# Patient Record
Sex: MALE | Race: ASIAN | NOT HISPANIC OR LATINO | URBAN - METROPOLITAN AREA
[De-identification: names, ages, dates, MRNs, and addresses within clinical notes are randomized per-mention and may not be internally consistent; named-entity substitution may affect disease eponyms.]

---

## 2017-03-12 ENCOUNTER — EMERGENCY (EMERGENCY)
Facility: HOSPITAL | Age: 32
LOS: 1 days | Discharge: HOME | End: 2017-03-12
Attending: EMERGENCY MEDICINE | Admitting: EMERGENCY MEDICINE
Payer: MEDICAID

## 2017-03-12 VITALS
DIASTOLIC BLOOD PRESSURE: 75 MMHG | RESPIRATION RATE: 18 BRPM | OXYGEN SATURATION: 100 % | TEMPERATURE: 98 F | HEART RATE: 78 BPM | SYSTOLIC BLOOD PRESSURE: 126 MMHG

## 2017-03-12 LAB
ALBUMIN SERPL ELPH-MCNC: 4.3 G/DL — SIGNIFICANT CHANGE UP (ref 3.3–5)
ALP SERPL-CCNC: 56 U/L — SIGNIFICANT CHANGE UP (ref 40–120)
ALT FLD-CCNC: 61 U/L — HIGH (ref 4–41)
APTT BLD: 32.4 SEC — SIGNIFICANT CHANGE UP (ref 27.5–37.4)
AST SERPL-CCNC: 27 U/L — SIGNIFICANT CHANGE UP (ref 4–40)
BASOPHILS # BLD AUTO: 0.03 K/UL — SIGNIFICANT CHANGE UP (ref 0–0.2)
BASOPHILS NFR BLD AUTO: 0.4 % — SIGNIFICANT CHANGE UP (ref 0–2)
BILIRUB SERPL-MCNC: 0.4 MG/DL — SIGNIFICANT CHANGE UP (ref 0.2–1.2)
BUN SERPL-MCNC: 14 MG/DL — SIGNIFICANT CHANGE UP (ref 7–23)
CALCIUM SERPL-MCNC: 9.2 MG/DL — SIGNIFICANT CHANGE UP (ref 8.4–10.5)
CHLORIDE SERPL-SCNC: 101 MMOL/L — SIGNIFICANT CHANGE UP (ref 98–107)
CK MB BLD-MCNC: 1.22 NG/ML — SIGNIFICANT CHANGE UP (ref 1–6.6)
CK MB BLD-MCNC: 1.37 NG/ML — SIGNIFICANT CHANGE UP (ref 1–6.6)
CK MB BLD-MCNC: SIGNIFICANT CHANGE UP (ref 0–2.5)
CK SERPL-CCNC: 117 U/L — SIGNIFICANT CHANGE UP (ref 30–200)
CK SERPL-CCNC: 124 U/L — SIGNIFICANT CHANGE UP (ref 30–200)
CO2 SERPL-SCNC: 25 MMOL/L — SIGNIFICANT CHANGE UP (ref 22–31)
CREAT SERPL-MCNC: 1.13 MG/DL — SIGNIFICANT CHANGE UP (ref 0.5–1.3)
EOSINOPHIL # BLD AUTO: 0.28 K/UL — SIGNIFICANT CHANGE UP (ref 0–0.5)
EOSINOPHIL NFR BLD AUTO: 3.9 % — SIGNIFICANT CHANGE UP (ref 0–6)
GLUCOSE SERPL-MCNC: 155 MG/DL — HIGH (ref 70–99)
HBA1C BLD-MCNC: 6.2 % — HIGH (ref 4–5.6)
HCT VFR BLD CALC: 44.5 % — SIGNIFICANT CHANGE UP (ref 39–50)
HGB BLD-MCNC: 14.7 G/DL — SIGNIFICANT CHANGE UP (ref 13–17)
IMM GRANULOCYTES NFR BLD AUTO: 0.1 % — SIGNIFICANT CHANGE UP (ref 0–1.5)
INR BLD: 1 — SIGNIFICANT CHANGE UP (ref 0.87–1.18)
LYMPHOCYTES # BLD AUTO: 2.74 K/UL — SIGNIFICANT CHANGE UP (ref 1–3.3)
LYMPHOCYTES # BLD AUTO: 38.6 % — SIGNIFICANT CHANGE UP (ref 13–44)
MCHC RBC-ENTMCNC: 25.4 PG — LOW (ref 27–34)
MCHC RBC-ENTMCNC: 33 % — SIGNIFICANT CHANGE UP (ref 32–36)
MCV RBC AUTO: 77 FL — LOW (ref 80–100)
MONOCYTES # BLD AUTO: 0.35 K/UL — SIGNIFICANT CHANGE UP (ref 0–0.9)
MONOCYTES NFR BLD AUTO: 4.9 % — SIGNIFICANT CHANGE UP (ref 2–14)
NEUTROPHILS # BLD AUTO: 3.69 K/UL — SIGNIFICANT CHANGE UP (ref 1.8–7.4)
NEUTROPHILS NFR BLD AUTO: 52.1 % — SIGNIFICANT CHANGE UP (ref 43–77)
PLATELET # BLD AUTO: 211 K/UL — SIGNIFICANT CHANGE UP (ref 150–400)
PMV BLD: 10.2 FL — SIGNIFICANT CHANGE UP (ref 7–13)
POTASSIUM SERPL-MCNC: 3.5 MMOL/L — SIGNIFICANT CHANGE UP (ref 3.5–5.3)
POTASSIUM SERPL-SCNC: 3.5 MMOL/L — SIGNIFICANT CHANGE UP (ref 3.5–5.3)
PROT SERPL-MCNC: 7.5 G/DL — SIGNIFICANT CHANGE UP (ref 6–8.3)
PROTHROM AB SERPL-ACNC: 11.4 SEC — SIGNIFICANT CHANGE UP (ref 10–13.1)
RBC # BLD: 5.78 M/UL — SIGNIFICANT CHANGE UP (ref 4.2–5.8)
RBC # FLD: 14.1 % — SIGNIFICANT CHANGE UP (ref 10.3–14.5)
SODIUM SERPL-SCNC: 142 MMOL/L — SIGNIFICANT CHANGE UP (ref 135–145)
TROPONIN T SERPL-MCNC: < 0.06 NG/ML — SIGNIFICANT CHANGE UP (ref 0–0.06)
TROPONIN T SERPL-MCNC: < 0.06 NG/ML — SIGNIFICANT CHANGE UP (ref 0–0.06)
WBC # BLD: 7.1 K/UL — SIGNIFICANT CHANGE UP (ref 3.8–10.5)
WBC # FLD AUTO: 7.1 K/UL — SIGNIFICANT CHANGE UP (ref 3.8–10.5)

## 2017-03-12 PROCEDURE — 71275 CT ANGIOGRAPHY CHEST: CPT | Mod: 26

## 2017-03-12 PROCEDURE — 93018 CV STRESS TEST I&R ONLY: CPT | Mod: GC

## 2017-03-12 PROCEDURE — 99218: CPT

## 2017-03-12 PROCEDURE — 93306 TTE W/DOPPLER COMPLETE: CPT | Mod: 26

## 2017-03-12 PROCEDURE — 71020: CPT | Mod: 26

## 2017-03-12 PROCEDURE — 93016 CV STRESS TEST SUPVJ ONLY: CPT | Mod: GC

## 2017-03-12 RX ORDER — ASPIRIN/CALCIUM CARB/MAGNESIUM 324 MG
162 TABLET ORAL ONCE
Qty: 0 | Refills: 0 | Status: COMPLETED | OUTPATIENT
Start: 2017-03-12 | End: 2017-03-12

## 2017-03-12 RX ADMIN — Medication 162 MILLIGRAM(S): at 05:14

## 2017-03-12 NOTE — ED PROVIDER NOTE - ATTENDING CONTRIBUTION TO CARE
31M presents with chest pain. Pain started 10d ago, lasts several minutes at a time.  Initially occurred every few days, but more recently has been occurring twice a day.  Pain is not related to exertion, not associated with PO intake.  No associated SOB, diaphoresis, nausea, or dizziness.  H/o abnormal ECHO as a child, which he has never followed up. On exam well appearing, nad, mmm, lungs clear, rrr, abd soft, no rash, no edema, 2+ pulses.  EKG LVH, LAD, no acute ischemia, CXR clear.  Trop negative.  Plan for CDU for serial trop, ECHO.

## 2017-03-12 NOTE — ED SUB INTERN NOTE - FAMILY HISTORY
Father  Still living? Unknown  Family history of acute myocardial infarction, Age at diagnosis: Age Unknown

## 2017-03-12 NOTE — ED ADULT NURSE NOTE - OBJECTIVE STATEMENT
Pt rcvd to 14 by Facilitator: c/o intermittent non-radiating L  sided CP x 10 days, relieved at present.  Pt states pain comes on for 3-5 mins at a time w/ increasing severity, then spontaneously resolves, pain is worse w/ deep inspiration. Denies sob/n/v/diaphoresis assc w/ pain. Denies significant PMHx, believes was told had a leaky valve when he was a child but no follow up.  Father had MI @ age 60.  Pt is . Denies recent travel/sick contacts/fevers/chills or other complaints.  Pt VSS, HR NSR on CM, Resps even/unlabored on % o2sat, appearing in NAD at this time. MD Fisher @ bedside for eval. IVL placed L AC 20g, labs obtained, call bell provided.  Awaits further plan of care. Report back to primary REMIGIO Garrett.

## 2017-03-12 NOTE — ED SUB INTERN NOTE - OBJECTIVE STATEMENT FT
30 yo M with no significant PMH, presents with left-sided chest pain x 10 days. Pain localized in the left mid-axillary region and does not radiate anywhere. 9/10 in severity and 10/10 with deep inspiration. No other alleviating or exacerbating factors. First episode of chest pain occurred 10 days ago, then recurred 7 days ago and 32 yo M with no significant PMH, presents with left-sided chest pain x 10 days. Pain localized in the left mid-axillary region and does not radiate anywhere. 9/10 in severity and 10/10 with deep inspiration. Chest pain described as heaviness in the chest and as if the nerves in the chest are pinched. Rubbing the chest slightly reduces the pain. No other alleviating or exacerbating factors. First episode of chest pain occurred 10 days prior to admission. Starting 3 days prior to admission, he has had 2 episodes of chest pain every day. Pain usually lasts 2-5 minutes. However, the last episode which brought him to the ED lasted for 2 hours. 30 yo M with no significant PMH, presents with left-sided chest pain x 10 days. Pain localized in the left mid-axillary region and does not radiate anywhere. 9/10 in severity and 10/10 with deep inspiration. Chest pain described as heaviness in the chest and as if the nerves in the chest are pinched. Rubbing the chest slightly reduces the pain. No other alleviating or exacerbating factors. First episode of chest pain occurred 10 days prior to admission. Starting 3 days prior to admission, he has had 2 episodes of chest pain every day. Pain usually lasts 2-5 minutes. However, the last episode which brought him to the ED lasted for 2 hours. Patient stated that he had an echo when he was younger 32 yo M with no significant PMH, presents with left-sided chest pain x 10 days. Pain localized in the left mid-axillary region and does not radiate anywhere. 9/10 in severity and 10/10 with deep inspiration. Chest pain described as heaviness in the chest and as if the nerves in the chest are pinched. Rubbing the chest slightly reduces the pain. No other alleviating or exacerbating factors. First episode of chest pain occurred 10 days prior to admission. Starting 3 days prior to admission, he has had 2 episodes of chest pain every day. Pain usually lasts 2-5 minutes. However, the last episode which brought him to the ED lasted for 2 hours. Denies fever, chills, cough, SOB, shoulder pain, and lower extremity edema. Patient stated that he had an echo when he was younger because he had a fever. Echo at the time showed blockage in one of his valves.

## 2017-03-12 NOTE — ED ADULT TRIAGE NOTE - CHIEF COMPLAINT QUOTE
Pt c/o left sided CP x10 days worsening, non radiating. Denies SOB, N/V. Denies any PMH but father had heart attack at 60. Pt appears comfortable in triage.

## 2017-03-12 NOTE — ED ADULT NURSE NOTE - ED STAT RN HANDOFF DETAILS
Rpt to CDU RN pt aaox4, vss, HR NSR on CM, denies CP currently, in NAD, stable at time of being brought over to CDU4

## 2017-03-12 NOTE — ED CDU PROVIDER NOTE - ATTENDING CONTRIBUTION TO CARE
CDU Attending Dr. Ruano: I assumed care of this pt at 7 AM on 3/12/17.  Pt is a 32 yo male with PMH "leaky valve" as a child, in ED with left axillary chest pain worse with deep inspiration and with body movement, as well as with exertion.  Symptoms present intermittently for 10 days.  No associated SOB, N/V/D, diaphoresis or abdominal pain.  On exam pt well appearing, in NAD, heart RRR, lungs CTAB, abd NTND, extremities without swelling, strength 5/5 in all extremities and skin without rash.

## 2017-03-12 NOTE — ED CDU PROVIDER NOTE - PROGRESS NOTE DETAILS
ZOIE Cobb:  Stress negative; Echo reveals severe aortic regurgitaiton with aortic dilation noted recommend CTA to further assess the aorta (ordered).  pt says he has appointment with Dr. Sharma (CT surgery @ [facility] next week). ZOIE Cobb:  Consulted cardiology Dr. Garcia over concern of aortic regurgitation.  Recommends pt to stay overnight and will see in AM.  Pt agrees to stay ZOIE Cobb:  Stress negative; Echo reveals severe aortic regurgitation with aortic dilation noted recommend CTA to further assess the aorta (ordered).  pt says he has appointment with Dr. Sharma (CT surgery @ [facility] next week). ZOIE Morillo  Pt sitting in bed comfortably, denies any chest pain or shortness of breath. CT angio chest performed, results are pending. ZOIE Morillo  Pt sitting in bed comfortably, denies any chest pain or shortness of breath. CT angio chest performed showing enlarged ascending thoracic aorta 3.5 cm. Dr. Garcia to see patient in morning. Will continue to monitor. ZOIE Morillo  Pt lying in bed comfortable on tele and in NAD. ce x 2- Neg. Will continue to monitor. ZOIE Dukes: Pt seen by Dr. Garcia, states is stable for outpatient follow up with cardiothoracic surgery, pt ok with plan. Discussed elevated A1C results and proper diet. CDU Att DC Note: Lobito Burgos is well appearing.  Seen by Dr. Garcia who recommended outpt f/u.  Pt already had appt pending with [facility].  Understands that he requires surgical repair d/t severity of valvular disease, though he asked several times if medical management would help. I advised that he f/u c [facility] as scheduled and d/w them, that he will likely require surgery.  Pt expressed acceptance/understanding. I have personally performed a face to face evaluation of this patient including review of the history and focused physical exam.  I have discussed the case and reviewed the plan of care with the PA. ZOIE Dukes: Pt wanted to follow up with cardiothoracic surgery here. Made an appt with Dr. Philip Alfred at 1:45 on 03/16/17, [address], contact information [place] [phone]

## 2017-03-12 NOTE — ED CDU PROVIDER NOTE - OBJECTIVE STATEMENT
31 M complaining of chest pain intermittent for past 10 days.  Pain is worse with inspiration.  No cough/fevers, no recent travel.  Patient reports abnormal echo as child but has never followed up as an adult.  No shortness of breath.  No nausea/vomiting.    CDU Note: Agree with above. 30 y/o male c/o intermittent left sided chest pain x 10 days - c/o pain worse certain movements and deep inspiration. States became more pronounced over past two days. States hx of abnormal echos in the past ?leaky valve. Sent to CDU for echo ce x 2 tele. Pt. resting comfortably currently.

## 2017-03-12 NOTE — ED SUB INTERN NOTE - MEDICAL DECISION MAKING DETAILS
Left axis deviation on EKG. Negative troponin and negative CK-MB. ACS unlikely. However, given history of valvular abnormality, transfer patient to CDU so patient can get an inpatient Echo during the day.

## 2017-03-12 NOTE — ED PROVIDER NOTE - OBJECTIVE STATEMENT
31 M complaining of chest pain intermittent for past 10 days.  Pain is worse with inspiration.  No cough/fevers, no recent travel.  Patient reports abnormal echo as child but has never followed up as an adult.  No shortness of breath.  No nausea/vomiting.

## 2017-03-13 VITALS
HEART RATE: 75 BPM | SYSTOLIC BLOOD PRESSURE: 120 MMHG | OXYGEN SATURATION: 100 % | TEMPERATURE: 98 F | DIASTOLIC BLOOD PRESSURE: 79 MMHG | RESPIRATION RATE: 18 BRPM

## 2017-03-13 PROBLEM — Z00.00 ENCOUNTER FOR PREVENTIVE HEALTH EXAMINATION: Status: ACTIVE | Noted: 2017-03-13

## 2017-03-13 PROCEDURE — 99217: CPT

## 2017-03-15 PROBLEM — Z78.9 ALCOHOL INGESTION: Status: ACTIVE | Noted: 2017-03-15

## 2017-03-15 PROBLEM — Z82.49 FAMILY HISTORY OF ACUTE MYOCARDIAL INFARCTION: Status: ACTIVE | Noted: 2017-03-15

## 2017-03-16 ENCOUNTER — APPOINTMENT (OUTPATIENT)
Dept: CARDIOTHORACIC SURGERY | Facility: CLINIC | Age: 32
End: 2017-03-16

## 2017-03-16 DIAGNOSIS — Z78.9 OTHER SPECIFIED HEALTH STATUS: ICD-10-CM

## 2017-03-16 DIAGNOSIS — Z82.49 FAMILY HISTORY OF ISCHEMIC HEART DISEASE AND OTHER DISEASES OF THE CIRCULATORY SYSTEM: ICD-10-CM

## 2017-03-20 ENCOUNTER — APPOINTMENT (OUTPATIENT)
Dept: CARDIOTHORACIC SURGERY | Facility: CLINIC | Age: 32
End: 2017-03-20

## 2017-03-20 VITALS
OXYGEN SATURATION: 97 % | TEMPERATURE: 98.4 F | HEART RATE: 90 BPM | DIASTOLIC BLOOD PRESSURE: 84 MMHG | HEIGHT: 71 IN | SYSTOLIC BLOOD PRESSURE: 134 MMHG | WEIGHT: 205 LBS | RESPIRATION RATE: 15 BRPM | BODY MASS INDEX: 28.7 KG/M2

## 2017-03-20 VITALS — DIASTOLIC BLOOD PRESSURE: 87 MMHG | SYSTOLIC BLOOD PRESSURE: 130 MMHG

## 2017-03-20 DIAGNOSIS — I35.1 NONRHEUMATIC AORTIC (VALVE) INSUFFICIENCY: ICD-10-CM

## 2017-03-20 DIAGNOSIS — G43.909 MIGRAINE, UNSPECIFIED, NOT INTRACTABLE, W/OUT STATUS MIGRAINOSUS: ICD-10-CM

## 2017-03-20 DIAGNOSIS — Z83.3 FAMILY HISTORY OF DIABETES MELLITUS: ICD-10-CM

## 2017-03-20 DIAGNOSIS — R73.03 PREDIABETES.: ICD-10-CM

## 2017-03-20 DIAGNOSIS — Z87.891 PERSONAL HISTORY OF NICOTINE DEPENDENCE: ICD-10-CM

## 2017-03-20 DIAGNOSIS — E78.00 PURE HYPERCHOLESTEROLEMIA, UNSPECIFIED: ICD-10-CM

## 2017-03-20 DIAGNOSIS — Z82.49 FAMILY HISTORY OF ISCHEMIC HEART DISEASE AND OTHER DISEASES OF THE CIRCULATORY SYSTEM: ICD-10-CM

## 2017-03-20 RX ORDER — ACETAMINOPHEN 325 MG/1
325 TABLET ORAL
Qty: 30 | Refills: 0 | Status: ACTIVE | COMMUNITY

## 2017-03-22 PROBLEM — I35.1 SEVERE AORTIC INSUFFICIENCY: Status: ACTIVE | Noted: 2017-03-15

## 2017-06-06 ENCOUNTER — EMERGENCY (EMERGENCY)
Facility: HOSPITAL | Age: 32
LOS: 1 days | Discharge: HOME | End: 2017-06-06
Attending: EMERGENCY MEDICINE | Admitting: EMERGENCY MEDICINE
Payer: MEDICAID

## 2017-06-06 VITALS
DIASTOLIC BLOOD PRESSURE: 81 MMHG | HEART RATE: 74 BPM | OXYGEN SATURATION: 100 % | RESPIRATION RATE: 16 BRPM | TEMPERATURE: 98 F | SYSTOLIC BLOOD PRESSURE: 128 MMHG

## 2017-06-06 VITALS
RESPIRATION RATE: 16 BRPM | HEART RATE: 60 BPM | SYSTOLIC BLOOD PRESSURE: 114 MMHG | DIASTOLIC BLOOD PRESSURE: 57 MMHG | OXYGEN SATURATION: 100 %

## 2017-06-06 LAB
ALBUMIN SERPL ELPH-MCNC: 4.4 G/DL — SIGNIFICANT CHANGE UP (ref 3.3–5)
ALP SERPL-CCNC: 65 U/L — SIGNIFICANT CHANGE UP (ref 40–120)
ALT FLD-CCNC: 69 U/L — HIGH (ref 4–41)
AST SERPL-CCNC: 27 U/L — SIGNIFICANT CHANGE UP (ref 4–40)
BASOPHILS # BLD AUTO: 0.04 K/UL — SIGNIFICANT CHANGE UP (ref 0–0.2)
BASOPHILS NFR BLD AUTO: 0.6 % — SIGNIFICANT CHANGE UP (ref 0–2)
BILIRUB SERPL-MCNC: 0.2 MG/DL — SIGNIFICANT CHANGE UP (ref 0.2–1.2)
BUN SERPL-MCNC: 14 MG/DL — SIGNIFICANT CHANGE UP (ref 7–23)
CALCIUM SERPL-MCNC: 9.3 MG/DL — SIGNIFICANT CHANGE UP (ref 8.4–10.5)
CHLORIDE SERPL-SCNC: 102 MMOL/L — SIGNIFICANT CHANGE UP (ref 98–107)
CK MB BLD-MCNC: 1.36 NG/ML — SIGNIFICANT CHANGE UP (ref 1–6.6)
CK MB BLD-MCNC: SIGNIFICANT CHANGE UP (ref 0–2.5)
CK SERPL-CCNC: 148 U/L — SIGNIFICANT CHANGE UP (ref 30–200)
CO2 SERPL-SCNC: 29 MMOL/L — SIGNIFICANT CHANGE UP (ref 22–31)
CREAT SERPL-MCNC: 1.2 MG/DL — SIGNIFICANT CHANGE UP (ref 0.5–1.3)
EOSINOPHIL # BLD AUTO: 0.25 K/UL — SIGNIFICANT CHANGE UP (ref 0–0.5)
EOSINOPHIL NFR BLD AUTO: 3.7 % — SIGNIFICANT CHANGE UP (ref 0–6)
GLUCOSE SERPL-MCNC: 109 MG/DL — HIGH (ref 70–99)
HCT VFR BLD CALC: 45.2 % — SIGNIFICANT CHANGE UP (ref 39–50)
HGB BLD-MCNC: 14.5 G/DL — SIGNIFICANT CHANGE UP (ref 13–17)
IMM GRANULOCYTES NFR BLD AUTO: 0.1 % — SIGNIFICANT CHANGE UP (ref 0–1.5)
LYMPHOCYTES # BLD AUTO: 2.9 K/UL — SIGNIFICANT CHANGE UP (ref 1–3.3)
LYMPHOCYTES # BLD AUTO: 43 % — SIGNIFICANT CHANGE UP (ref 13–44)
MCHC RBC-ENTMCNC: 25.6 PG — LOW (ref 27–34)
MCHC RBC-ENTMCNC: 32.1 % — SIGNIFICANT CHANGE UP (ref 32–36)
MCV RBC AUTO: 79.7 FL — LOW (ref 80–100)
MONOCYTES # BLD AUTO: 0.55 K/UL — SIGNIFICANT CHANGE UP (ref 0–0.9)
MONOCYTES NFR BLD AUTO: 8.2 % — SIGNIFICANT CHANGE UP (ref 2–14)
NEUTROPHILS # BLD AUTO: 2.99 K/UL — SIGNIFICANT CHANGE UP (ref 1.8–7.4)
NEUTROPHILS NFR BLD AUTO: 44.4 % — SIGNIFICANT CHANGE UP (ref 43–77)
PLATELET # BLD AUTO: 233 K/UL — SIGNIFICANT CHANGE UP (ref 150–400)
PMV BLD: 10.1 FL — SIGNIFICANT CHANGE UP (ref 7–13)
POTASSIUM SERPL-MCNC: 3.8 MMOL/L — SIGNIFICANT CHANGE UP (ref 3.5–5.3)
POTASSIUM SERPL-SCNC: 3.8 MMOL/L — SIGNIFICANT CHANGE UP (ref 3.5–5.3)
PROT SERPL-MCNC: 7.4 G/DL — SIGNIFICANT CHANGE UP (ref 6–8.3)
RBC # BLD: 5.67 M/UL — SIGNIFICANT CHANGE UP (ref 4.2–5.8)
RBC # FLD: 14.8 % — HIGH (ref 10.3–14.5)
SODIUM SERPL-SCNC: 142 MMOL/L — SIGNIFICANT CHANGE UP (ref 135–145)
TROPONIN T SERPL-MCNC: < 0.06 NG/ML — SIGNIFICANT CHANGE UP (ref 0–0.06)
WBC # BLD: 6.74 K/UL — SIGNIFICANT CHANGE UP (ref 3.8–10.5)
WBC # FLD AUTO: 6.74 K/UL — SIGNIFICANT CHANGE UP (ref 3.8–10.5)

## 2017-06-06 PROCEDURE — 71020: CPT | Mod: 26

## 2017-06-06 PROCEDURE — 93010 ELECTROCARDIOGRAM REPORT: CPT

## 2017-06-06 PROCEDURE — 99285 EMERGENCY DEPT VISIT HI MDM: CPT | Mod: 25

## 2017-06-06 NOTE — ED ADULT NURSE NOTE - OBJECTIVE STATEMENT
Pt. received in room # 6, A&O x3 with c/o chest pain x 2 days; worst today with sob. Pt. stated he had a leaky heart valve and was here for the same thing 3 months ago. CM to NSR. no acute distress noted. will continue to monitor.

## 2017-06-06 NOTE — ED ADULT TRIAGE NOTE - CHIEF COMPLAINT QUOTE
pt. c/o left sided intermittent chest pain . Pt. states pain started 3 days ago , and states he occasionally feels dizzy. Pt. denies dizziness and chest pain at this moment .  Pt. states he has a valve leakage and was told he will be needing surgery. Pt. appears comfort bale in triage. EKG will be completed in triage.

## 2017-06-06 NOTE — ED PROVIDER NOTE - MEDICAL DECISION MAKING DETAILS
well appearing, normal VS, chest pain is pinching, fleeting, nonexertional and nonradiating, will get XR, EKG, if normal pt needs to f/u with CT surg Dr. mills

## 2017-06-06 NOTE — ED PROVIDER NOTE - OBJECTIVE STATEMENT
31M h/o aortic regurg p/w chest pain.  States it is a pinching pain, comes and goes, lasts for a second, pain is non-radiating, non-exertional.  Pt had pain similar to this a few months ago, was seen in ED and had ECHO showing severe aortic regurg, pt was seen by CT surgeon Dr. Alfred who recommended AVR at some point, pt wanted to wait.  Pain he is feeling today is the same as last time, not any worse, denies SOB, cough, fever, abd pain, n/v/d.

## 2017-08-24 ENCOUNTER — EMERGENCY (EMERGENCY)
Facility: HOSPITAL | Age: 32
LOS: 1 days | Discharge: HOME | End: 2017-08-24
Attending: EMERGENCY MEDICINE | Admitting: EMERGENCY MEDICINE
Payer: MEDICAID

## 2017-08-24 VITALS
RESPIRATION RATE: 18 BRPM | OXYGEN SATURATION: 100 % | TEMPERATURE: 98 F | DIASTOLIC BLOOD PRESSURE: 68 MMHG | HEART RATE: 77 BPM | SYSTOLIC BLOOD PRESSURE: 113 MMHG

## 2017-08-24 VITALS — HEART RATE: 87 BPM | TEMPERATURE: 98 F

## 2017-08-24 LAB
ALBUMIN SERPL ELPH-MCNC: 4.5 G/DL — SIGNIFICANT CHANGE UP (ref 3.3–5)
ALP SERPL-CCNC: 63 U/L — SIGNIFICANT CHANGE UP (ref 40–120)
ALT FLD-CCNC: 92 U/L — HIGH (ref 4–41)
APTT BLD: 33.9 SEC — SIGNIFICANT CHANGE UP (ref 27.5–37.4)
AST SERPL-CCNC: 38 U/L — SIGNIFICANT CHANGE UP (ref 4–40)
BASOPHILS # BLD AUTO: 0.02 K/UL — SIGNIFICANT CHANGE UP (ref 0–0.2)
BASOPHILS NFR BLD AUTO: 0.4 % — SIGNIFICANT CHANGE UP (ref 0–2)
BILIRUB SERPL-MCNC: 0.3 MG/DL — SIGNIFICANT CHANGE UP (ref 0.2–1.2)
BUN SERPL-MCNC: 17 MG/DL — SIGNIFICANT CHANGE UP (ref 7–23)
CALCIUM SERPL-MCNC: 9.9 MG/DL — SIGNIFICANT CHANGE UP (ref 8.4–10.5)
CHLORIDE SERPL-SCNC: 104 MMOL/L — SIGNIFICANT CHANGE UP (ref 98–107)
CK MB BLD-MCNC: 1.72 NG/ML — SIGNIFICANT CHANGE UP (ref 1–6.6)
CO2 SERPL-SCNC: 27 MMOL/L — SIGNIFICANT CHANGE UP (ref 22–31)
CREAT SERPL-MCNC: 1.04 MG/DL — SIGNIFICANT CHANGE UP (ref 0.5–1.3)
EOSINOPHIL # BLD AUTO: 0.21 K/UL — SIGNIFICANT CHANGE UP (ref 0–0.5)
EOSINOPHIL NFR BLD AUTO: 3.9 % — SIGNIFICANT CHANGE UP (ref 0–6)
GLUCOSE SERPL-MCNC: 101 MG/DL — HIGH (ref 70–99)
HCT VFR BLD CALC: 44.9 % — SIGNIFICANT CHANGE UP (ref 39–50)
HGB BLD-MCNC: 14.4 G/DL — SIGNIFICANT CHANGE UP (ref 13–17)
IMM GRANULOCYTES # BLD AUTO: 0.02 # — SIGNIFICANT CHANGE UP
IMM GRANULOCYTES NFR BLD AUTO: 0.4 % — SIGNIFICANT CHANGE UP (ref 0–1.5)
INR BLD: 0.9 — SIGNIFICANT CHANGE UP (ref 0.88–1.17)
LYMPHOCYTES # BLD AUTO: 2.33 K/UL — SIGNIFICANT CHANGE UP (ref 1–3.3)
LYMPHOCYTES # BLD AUTO: 42.8 % — SIGNIFICANT CHANGE UP (ref 13–44)
MCHC RBC-ENTMCNC: 24.9 PG — LOW (ref 27–34)
MCHC RBC-ENTMCNC: 32.1 % — SIGNIFICANT CHANGE UP (ref 32–36)
MCV RBC AUTO: 77.5 FL — LOW (ref 80–100)
MONOCYTES # BLD AUTO: 0.46 K/UL — SIGNIFICANT CHANGE UP (ref 0–0.9)
MONOCYTES NFR BLD AUTO: 8.5 % — SIGNIFICANT CHANGE UP (ref 2–14)
NEUTROPHILS # BLD AUTO: 2.4 K/UL — SIGNIFICANT CHANGE UP (ref 1.8–7.4)
NEUTROPHILS NFR BLD AUTO: 44 % — SIGNIFICANT CHANGE UP (ref 43–77)
NRBC # FLD: 0 — SIGNIFICANT CHANGE UP
PLATELET # BLD AUTO: 230 K/UL — SIGNIFICANT CHANGE UP (ref 150–400)
PMV BLD: 9.7 FL — SIGNIFICANT CHANGE UP (ref 7–13)
POTASSIUM SERPL-MCNC: 3.7 MMOL/L — SIGNIFICANT CHANGE UP (ref 3.5–5.3)
POTASSIUM SERPL-SCNC: 3.7 MMOL/L — SIGNIFICANT CHANGE UP (ref 3.5–5.3)
PROT SERPL-MCNC: 7.6 G/DL — SIGNIFICANT CHANGE UP (ref 6–8.3)
PROTHROM AB SERPL-ACNC: 10.1 SEC — SIGNIFICANT CHANGE UP (ref 9.8–13.1)
RBC # BLD: 5.79 M/UL — SIGNIFICANT CHANGE UP (ref 4.2–5.8)
RBC # FLD: 13.9 % — SIGNIFICANT CHANGE UP (ref 10.3–14.5)
SODIUM SERPL-SCNC: 143 MMOL/L — SIGNIFICANT CHANGE UP (ref 135–145)
TROPONIN T SERPL-MCNC: < 0.06 NG/ML — SIGNIFICANT CHANGE UP (ref 0–0.06)
WBC # BLD: 5.44 K/UL — SIGNIFICANT CHANGE UP (ref 3.8–10.5)
WBC # FLD AUTO: 5.44 K/UL — SIGNIFICANT CHANGE UP (ref 3.8–10.5)

## 2017-08-24 PROCEDURE — 71020: CPT | Mod: 26

## 2017-08-24 PROCEDURE — 93010 ELECTROCARDIOGRAM REPORT: CPT | Mod: 59

## 2017-08-24 PROCEDURE — 99284 EMERGENCY DEPT VISIT MOD MDM: CPT | Mod: 25

## 2017-08-24 RX ORDER — ACETAMINOPHEN 500 MG
975 TABLET ORAL ONCE
Qty: 0 | Refills: 0 | Status: COMPLETED | OUTPATIENT
Start: 2017-08-24 | End: 2017-08-24

## 2017-08-24 RX ORDER — ASPIRIN/CALCIUM CARB/MAGNESIUM 324 MG
162 TABLET ORAL ONCE
Qty: 0 | Refills: 0 | Status: COMPLETED | OUTPATIENT
Start: 2017-08-24 | End: 2017-08-24

## 2017-08-24 RX ADMIN — Medication 162 MILLIGRAM(S): at 13:31

## 2017-08-24 RX ADMIN — Medication 975 MILLIGRAM(S): at 14:49

## 2017-08-24 NOTE — ED PROVIDER NOTE - PROGRESS NOTE DETAILS
Gollogly: trop neg. Pt feels well, comfortable going home and following up outpt. As pt's pain x 1 week, will not repeat. Will give cardiology follow up. Instructed pt to see own CT surgeon.

## 2017-08-24 NOTE — ED PROVIDER NOTE - OBJECTIVE STATEMENT
31M h/o aortic regurgitation p/w CP. Pt with 2 different types of CP - one is a pinching pain x 1 week, L-sided radiating to the back, worse with movement of the L arm, pleuritic, similar to when pt went to the ED in June. The other pain is retrosternal, non-radiating, exertional, described as heaviness, started yesterday at rest, intermittent. FH: mother had MI at age 45. Nonsmoker. Pt recommended to have AVR by CT surgeon Dr Perdue, currently no follow up appt. No fever, cough, SOB, vomiting, hx DVT/PE/cancer, calf pain/swelling, hemoptysis, or recent trauma/surgery/immobilization. 31M h/o aortic regurgitation p/w CP. Pt with 2 different types of CP - one is a pinching pain x 1 week, L-sided radiating to the back, worse with movement of the L arm, pleuritic, similar to when pt went to the ED in June. The other pain is retrosternal, non-radiating, exertional, described as heaviness, started yesterday at rest, intermittent. FH: mother had MI at age 45. Nonsmoker. Pt recommended to have AVR by CT surgeon Dr Perdue, currently no follow up appt. No fever, cough, SOB, vomiting, hx DVT/PE/cancer, calf pain/swelling, hemoptysis, or recent trauma/surgery/immobilization.  Attending - Reviewed above.  I evaluated patient myself and obtained the following history.  I reviewed previous [place] records.  30 y/o M hx AR, states that for past approx 1 week he has pain in left side of chest, in mid-axillary line, only with movement.  Also notes pain present when he lifts heavy object with left arm.  No known trauma to area.  No shortness of breathe.  No n/v/diaphoresis.  Has not taken any medication for the pain. 31M h/o aortic regurgitation p/w CP. Pt with 2 different types of CP - one is a pinching pain x 1 week, L-sided radiating to the back, worse with movement of the L arm, pleuritic, similar to when pt went to the ED in June. The other pain is retrosternal, non-radiating, exertional, described as heaviness, started at rest, x1 week. FH: mother had MI at age 45. Nonsmoker. Pt recommended to have AVR by CT surgeon Dr Perdue, currently no follow up appt. No fever, cough, SOB, vomiting, hx DVT/PE/cancer, calf pain/swelling, hemoptysis, or recent trauma/surgery/immobilization.  Attending - Reviewed above.  I evaluated patient myself and obtained the following history.  I reviewed previous [place] records.  30 y/o M hx AR, states that for past approx 1 week he has pain in left side of chest, in mid-axillary line, only with movement.  Also notes pain present when he lifts heavy object with left arm.  No known trauma to area.  No shortness of breathe.  No n/v/diaphoresis.  Has not taken any medication for the pain.

## 2017-08-24 NOTE — ED PROVIDER NOTE - PHYSICAL EXAMINATION
ATTENDING PHYSICAL EXAM DR. Trinh ***GEN - NAD; well appearing; A+O x3 ***HEAD - NC/AT ***EYES/NOSE - PERRL, EOMI, mucous membranes moist, no discharge ***THROAT: Oral cavity and pharynx normal. No inflammation, swelling, exudate, or lesions.  ***NECK: Neck supple, non-tender without lymphadenopathy, no masses, no JVD.   ***PULMONARY - CTA b/l, symmetric breath sounds. ***CARDIAC -s1s2, RRR, no M,R,G  ***ABDOMEN - +BS, ND, NT, soft, no guarding, no rebound, no masses   ***BACK - no CVA tenderness, Normal  spine ***EXTREMITIES - symmetric pulses, 2+ dp, capillary refill < 2 seconds, no clubbing, no cyanosis, no edema ***SKIN - no rash or bruising   ***NEUROLOGIC - alert, CN 2-12 intact, reflexes nl, sensation nl, coordination nl, finger to nose nl, romberg negative, motor 5/5 RUE/LUE/RLE/LLE/EHL/Plantar flexion, no pronator drift, gait nl

## 2017-08-24 NOTE — ED PROVIDER NOTE - CARE PLAN
Principal Discharge DX:	Chest pain  Instructions for follow-up, activity and diet:	-- Please follow up with a cardiologist within a week. Call [phone] to schedule an appointment ASAP. Most patients can be seen within 48 hours. Mention that you were just discharged from the emergency room and need to be seen immediately.   -- Follow up with Dr Hills within 1 week.  -- Return to ER immediately for new or worsening symptoms, any urgent issues, or for any concerns.

## 2017-08-24 NOTE — ED ADULT NURSE NOTE - OBJECTIVE STATEMENT
Alert and oriented x 4. Pt received to spot 1 due to chest pain. Pt states : " I have pain on and off for one week. When I lift my left arm it hurts more." Pt has no pain at this time. Pt denies shortness of breath, nausea, vomiting or dizziness.  No painful urination or diarrhea. Pt ambulates. IV 20g to right AC. Labs drawn. VSS. Sinus rhythm on cardiac monitor. Will continue to monitor.

## 2017-08-24 NOTE — ED PROVIDER NOTE - MEDICAL DECISION MAKING DETAILS
31M h/o aortic regurg p/w new chest pain since yesterday. Concern for ACS. No risk factors for PE (perc neg). Plan: ekg, cxr, labs, trop x 2, follow up with cardiology and with pt's CT surgeon for AVR.

## 2017-08-24 NOTE — ED PROVIDER NOTE - PLAN OF CARE
-- Please follow up with a cardiologist within a week. Call [phone] to schedule an appointment ASAP. Most patients can be seen within 48 hours. Mention that you were just discharged from the emergency room and need to be seen immediately.   -- Follow up with Dr Hills within 1 week.  -- Return to ER immediately for new or worsening symptoms, any urgent issues, or for any concerns.

## 2017-12-04 NOTE — ED PROVIDER NOTE - PSH
No significant past surgical history Normal vision: sees adequately in most situations; can see medication labels, newsprint/with glasses

## 2018-06-23 ENCOUNTER — EMERGENCY (EMERGENCY)
Facility: HOSPITAL | Age: 33
LOS: 1 days | Discharge: HOME | End: 2018-06-23
Attending: EMERGENCY MEDICINE | Admitting: EMERGENCY MEDICINE
Payer: MEDICAID

## 2018-06-23 PROCEDURE — 99285 EMERGENCY DEPT VISIT HI MDM: CPT | Mod: 25

## 2018-06-24 VITALS
SYSTOLIC BLOOD PRESSURE: 132 MMHG | OXYGEN SATURATION: 100 % | DIASTOLIC BLOOD PRESSURE: 73 MMHG | HEART RATE: 81 BPM | RESPIRATION RATE: 18 BRPM | TEMPERATURE: 98 F

## 2018-06-24 VITALS
RESPIRATION RATE: 14 BRPM | OXYGEN SATURATION: 98 % | SYSTOLIC BLOOD PRESSURE: 120 MMHG | DIASTOLIC BLOOD PRESSURE: 71 MMHG | TEMPERATURE: 98 F | HEART RATE: 94 BPM

## 2018-06-24 LAB
ALBUMIN SERPL ELPH-MCNC: 4.2 G/DL — SIGNIFICANT CHANGE UP (ref 3.3–5)
ALP SERPL-CCNC: 60 U/L — SIGNIFICANT CHANGE UP (ref 40–120)
ALT FLD-CCNC: 128 U/L — HIGH (ref 4–41)
AST SERPL-CCNC: 51 U/L — HIGH (ref 4–40)
BASOPHILS # BLD AUTO: 0.04 K/UL — SIGNIFICANT CHANGE UP (ref 0–0.2)
BASOPHILS NFR BLD AUTO: 0.5 % — SIGNIFICANT CHANGE UP (ref 0–2)
BILIRUB SERPL-MCNC: 0.2 MG/DL — SIGNIFICANT CHANGE UP (ref 0.2–1.2)
BUN SERPL-MCNC: 17 MG/DL — SIGNIFICANT CHANGE UP (ref 7–23)
CALCIUM SERPL-MCNC: 9.1 MG/DL — SIGNIFICANT CHANGE UP (ref 8.4–10.5)
CHLORIDE SERPL-SCNC: 99 MMOL/L — SIGNIFICANT CHANGE UP (ref 98–107)
CO2 SERPL-SCNC: 26 MMOL/L — SIGNIFICANT CHANGE UP (ref 22–31)
CREAT SERPL-MCNC: 1.3 MG/DL — SIGNIFICANT CHANGE UP (ref 0.5–1.3)
EOSINOPHIL # BLD AUTO: 0.31 K/UL — SIGNIFICANT CHANGE UP (ref 0–0.5)
EOSINOPHIL NFR BLD AUTO: 3.5 % — SIGNIFICANT CHANGE UP (ref 0–6)
GLUCOSE SERPL-MCNC: 112 MG/DL — HIGH (ref 70–99)
HCT VFR BLD CALC: 45.4 % — SIGNIFICANT CHANGE UP (ref 39–50)
HGB BLD-MCNC: 14.4 G/DL — SIGNIFICANT CHANGE UP (ref 13–17)
IMM GRANULOCYTES # BLD AUTO: 0.03 # — SIGNIFICANT CHANGE UP
IMM GRANULOCYTES NFR BLD AUTO: 0.3 % — SIGNIFICANT CHANGE UP (ref 0–1.5)
LYMPHOCYTES # BLD AUTO: 2.82 K/UL — SIGNIFICANT CHANGE UP (ref 1–3.3)
LYMPHOCYTES # BLD AUTO: 31.9 % — SIGNIFICANT CHANGE UP (ref 13–44)
MCHC RBC-ENTMCNC: 25.1 PG — LOW (ref 27–34)
MCHC RBC-ENTMCNC: 31.7 % — LOW (ref 32–36)
MCV RBC AUTO: 79.2 FL — LOW (ref 80–100)
MONOCYTES # BLD AUTO: 0.6 K/UL — SIGNIFICANT CHANGE UP (ref 0–0.9)
MONOCYTES NFR BLD AUTO: 6.8 % — SIGNIFICANT CHANGE UP (ref 2–14)
NEUTROPHILS # BLD AUTO: 5.05 K/UL — SIGNIFICANT CHANGE UP (ref 1.8–7.4)
NEUTROPHILS NFR BLD AUTO: 57 % — SIGNIFICANT CHANGE UP (ref 43–77)
NRBC # FLD: 0 — SIGNIFICANT CHANGE UP
PLATELET # BLD AUTO: 228 K/UL — SIGNIFICANT CHANGE UP (ref 150–400)
PMV BLD: 10.7 FL — SIGNIFICANT CHANGE UP (ref 7–13)
POTASSIUM SERPL-MCNC: 3.7 MMOL/L — SIGNIFICANT CHANGE UP (ref 3.5–5.3)
POTASSIUM SERPL-SCNC: 3.7 MMOL/L — SIGNIFICANT CHANGE UP (ref 3.5–5.3)
PROT SERPL-MCNC: 7.3 G/DL — SIGNIFICANT CHANGE UP (ref 6–8.3)
RBC # BLD: 5.73 M/UL — SIGNIFICANT CHANGE UP (ref 4.2–5.8)
RBC # FLD: 14.1 % — SIGNIFICANT CHANGE UP (ref 10.3–14.5)
SODIUM SERPL-SCNC: 137 MMOL/L — SIGNIFICANT CHANGE UP (ref 135–145)
TROPONIN T, HIGH SENSITIVITY: 11 NG/L — SIGNIFICANT CHANGE UP (ref ?–14)
TROPONIN T, HIGH SENSITIVITY: 12 NG/L — SIGNIFICANT CHANGE UP (ref ?–14)
WBC # BLD: 8.85 K/UL — SIGNIFICANT CHANGE UP (ref 3.8–10.5)
WBC # FLD AUTO: 8.85 K/UL — SIGNIFICANT CHANGE UP (ref 3.8–10.5)

## 2018-06-24 PROCEDURE — 71046 X-RAY EXAM CHEST 2 VIEWS: CPT | Mod: 26

## 2018-06-24 RX ORDER — SODIUM CHLORIDE 9 MG/ML
3 INJECTION INTRAMUSCULAR; INTRAVENOUS; SUBCUTANEOUS ONCE
Qty: 0 | Refills: 0 | Status: COMPLETED | OUTPATIENT
Start: 2018-06-24 | End: 2018-06-24

## 2018-06-24 RX ORDER — IBUPROFEN 200 MG
600 TABLET ORAL ONCE
Qty: 0 | Refills: 0 | Status: COMPLETED | OUTPATIENT
Start: 2018-06-24 | End: 2018-06-24

## 2018-06-24 RX ADMIN — Medication 600 MILLIGRAM(S): at 03:45

## 2018-06-24 RX ADMIN — SODIUM CHLORIDE 3 MILLILITER(S): 9 INJECTION INTRAMUSCULAR; INTRAVENOUS; SUBCUTANEOUS at 03:00

## 2018-06-24 NOTE — ED PROVIDER NOTE - PHYSICAL EXAMINATION
Gen: NAD, AOx3  Head: NCAT  HEENT: PERRL, oral mucosa moist, normal conjunctiva  Lung: CTAB, no respiratory distress  CV: rrr, no murmurs, Normal perfusion  Abd: soft, NTND, no CVA tenderness  MSK: No edema, no visible deformities, nontender chest wall, L shoulder nontender  Neuro: No focal neurologic deficits  Skin: No rash   Psych: normal affect

## 2018-06-24 NOTE — ED ADULT NURSE NOTE - CHIEF COMPLAINT QUOTE
C/o left sided chest pain worsening since this afternoon seen at [place] for same problem with full negative cardiac workup but states "pain didn't go away". Denies PMH, EKG in progress

## 2018-06-24 NOTE — ED ADULT TRIAGE NOTE - CHIEF COMPLAINT QUOTE
C/o left sided chest pain worsening since this afternoon seen at [place] for same problem with full negative cardiac workup but states "pain didn't go away". Denies PMH, EKG in progress

## 2018-06-24 NOTE — ED PROVIDER NOTE - PROGRESS NOTE DETAILS
Patient re-assessed s/p 2nd trop. Feeling improved. Delta trop 1, is negative. Pt educated on proper follw-up and indications to return to ED. Stable for d/c. - Jeremiah Elise MD

## 2018-06-24 NOTE — ED PROVIDER NOTE - ATTENDING CONTRIBUTION TO CARE
32M pmh aortic regurg presents with gradual onset L-sided chest pain/pressure beginning earlier today. worse with palpation and moving L arm. pain constant, moderate, non-radiating. denies sob. ?pleuritic. denies f/c, cough, congestion, n/v/d. denies LE edema, calf pain/swelling, hx dvt/pe. father did have MI at 61 y/o. was seen at [facility] today, states had negative bloodwork and d/c'd.     PE: NAD, NCAT, MMM, Trachea midline, Normal conjunctiva, lungs CTAB, S1/S2 RRR, Normal perfusion, 2+ radial pulses bilat, Abdomen Soft, NTND, No rebound/guarding, No LE edema, No deformity of extremities, No rashes,  No focal motor or sensory deficits. No calf ttp or swelling. Pain reproducible with papation of L pectoral muscle.    32M pmh aortic regurg presents with chest pain. Low suspicion of ACS. HEART score 2 with negative trop. EKG with findings suggesting LVH. Pt without tachycardia, hypoxia, sob, PERC neg, extremely low suspicion pe. Without rad to back, equal pulses, extremely low suspicion aortic pathologoy. Check CBC eval for anemia, cmp eval for metabolic derangement. CXR. Give ibuprofen for analgesia. Re-assess. - Jeremiah Elise MD

## 2018-06-24 NOTE — ED PROVIDER NOTE - OBJECTIVE STATEMENT
Patient is 32 y M with PMH presenting with     PMD:  ROS: Denies fever, palpitations, chills, recent sickness, HA, vision changes, cough, SOB, chest pain, abdominal pain, n/v/d/c, dysuria, hematuria, rash, new joint aches, sick contacts, and recent travel. 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 No trauma.  Fam hx of MI in father 60yrs old  Was seen at [facility] today, had negative blood work and chest xray.   Works as   Took tylenol 500 mg 10 pm, no relief.     PMD/cardio: [facility]  ROS: Denies fever, palpitations, chills, recent sickness, HA, vision changes, cough, SOB, abdominal pain, n/v/d/c, dysuria, hematuria, rash, new joint aches, sick contacts, and recent travel.

## 2018-06-24 NOTE — ED ADULT NURSE NOTE - OBJECTIVE STATEMENT
Pt with a pmhx of "leaky valve" received in #2, aaox3 with c/o left sided chest pain and left scapula pain since this afternoon. Pt states that the pain is achy in nature and worse upon movement, "when I lift up my arm its worse". Pt denies sob, nausea, vomiting, dizziness, diaphoresis. Pt seen at [facility] this evening, had blood work with a negative troponin and x-ray. Pt was subsequently discharged home but presents for a second opinion. Pt on CM in SR with continuous pulse oximetry.

## 2018-06-24 NOTE — ED PROVIDER NOTE - MEDICAL DECISION MAKING DETAILS
Low heart score, atypical chest pain, worsened by movement and weightbearing on left arm suggests MSK. Plan: labs, trop, ekg, cxr, pain control

## 2018-07-24 PROBLEM — R73.03 PREDIABETES: Status: ACTIVE | Noted: 2017-03-20

## 2020-02-28 ENCOUNTER — EMERGENCY (EMERGENCY)
Facility: HOSPITAL | Age: 35
LOS: 1 days | Discharge: LEFT BEFORE DISCHARGE | End: 2020-02-28
Admitting: EMERGENCY MEDICINE
Payer: COMMERCIAL

## 2020-02-28 VITALS
TEMPERATURE: 98 F | OXYGEN SATURATION: 100 % | RESPIRATION RATE: 16 BRPM | HEART RATE: 79 BPM | DIASTOLIC BLOOD PRESSURE: 76 MMHG | SYSTOLIC BLOOD PRESSURE: 132 MMHG

## 2020-02-28 PROBLEM — I35.1 NONRHEUMATIC AORTIC (VALVE) INSUFFICIENCY: Chronic | Status: ACTIVE | Noted: 2017-08-24

## 2020-02-28 PROCEDURE — L9991: CPT

## 2020-02-28 NOTE — ED ADULT TRIAGE NOTE - CHIEF COMPLAINT QUOTE
Left side non-exertional chest pain and SOB x 1 day. Denies dizziness/weakness/n+v. Hx Aortic Regurgiation

## 2021-09-01 ENCOUNTER — APPOINTMENT (EMERGENCY)
Dept: RADIOLOGY | Facility: HOSPITAL | Age: 36
End: 2021-09-01
Payer: COMMERCIAL

## 2021-09-01 ENCOUNTER — HOSPITAL ENCOUNTER (EMERGENCY)
Facility: HOSPITAL | Age: 36
Discharge: HOME | End: 2021-09-01
Attending: EMERGENCY MEDICINE
Payer: COMMERCIAL

## 2021-09-01 VITALS
HEART RATE: 64 BPM | RESPIRATION RATE: 18 BRPM | SYSTOLIC BLOOD PRESSURE: 132 MMHG | DIASTOLIC BLOOD PRESSURE: 72 MMHG | OXYGEN SATURATION: 98 % | TEMPERATURE: 98.6 F

## 2021-09-01 DIAGNOSIS — R07.9 CHEST PAIN: Primary | ICD-10-CM

## 2021-09-01 LAB
ALBUMIN SERPL BCP-MCNC: 4.1 G/DL (ref 3.5–5)
ALP SERPL-CCNC: 72 U/L (ref 46–116)
ALT SERPL W P-5'-P-CCNC: 115 U/L (ref 12–78)
ANION GAP SERPL CALCULATED.3IONS-SCNC: 12 MMOL/L (ref 4–13)
AST SERPL W P-5'-P-CCNC: 42 U/L (ref 5–45)
BASOPHILS # BLD AUTO: 0.04 THOUSANDS/ΜL (ref 0–0.1)
BASOPHILS NFR BLD AUTO: 1 % (ref 0–1)
BILIRUB SERPL-MCNC: 0.27 MG/DL (ref 0.2–1)
BUN SERPL-MCNC: 15 MG/DL (ref 5–25)
CALCIUM SERPL-MCNC: 9.6 MG/DL (ref 8.3–10.1)
CHLORIDE SERPL-SCNC: 103 MMOL/L (ref 100–108)
CO2 SERPL-SCNC: 26 MMOL/L (ref 21–32)
CREAT SERPL-MCNC: 1.28 MG/DL (ref 0.6–1.3)
D DIMER PPP FEU-MCNC: <0.27 UG/ML FEU
EOSINOPHIL # BLD AUTO: 0.2 THOUSAND/ΜL (ref 0–0.61)
EOSINOPHIL NFR BLD AUTO: 3 % (ref 0–6)
ERYTHROCYTE [DISTWIDTH] IN BLOOD BY AUTOMATED COUNT: 13.6 % (ref 11.6–15.1)
GFR SERPL CREATININE-BSD FRML MDRD: 73 ML/MIN/1.73SQ M
GLUCOSE SERPL-MCNC: 103 MG/DL (ref 65–140)
HCT VFR BLD AUTO: 45 % (ref 36.5–49.3)
HGB BLD-MCNC: 14.7 G/DL (ref 12–17)
IMM GRANULOCYTES # BLD AUTO: 0.03 THOUSAND/UL (ref 0–0.2)
IMM GRANULOCYTES NFR BLD AUTO: 0 % (ref 0–2)
LYMPHOCYTES # BLD AUTO: 2.8 THOUSANDS/ΜL (ref 0.6–4.47)
LYMPHOCYTES NFR BLD AUTO: 37 % (ref 14–44)
MCH RBC QN AUTO: 26.3 PG (ref 26.8–34.3)
MCHC RBC AUTO-ENTMCNC: 32.7 G/DL (ref 31.4–37.4)
MCV RBC AUTO: 80 FL (ref 82–98)
MONOCYTES # BLD AUTO: 0.58 THOUSAND/ΜL (ref 0.17–1.22)
MONOCYTES NFR BLD AUTO: 8 % (ref 4–12)
NEUTROPHILS # BLD AUTO: 4.01 THOUSANDS/ΜL (ref 1.85–7.62)
NEUTS SEG NFR BLD AUTO: 51 % (ref 43–75)
NRBC BLD AUTO-RTO: 0 /100 WBCS
PLATELET # BLD AUTO: 242 THOUSANDS/UL (ref 149–390)
PMV BLD AUTO: 10.4 FL (ref 8.9–12.7)
POTASSIUM SERPL-SCNC: 4.2 MMOL/L (ref 3.5–5.3)
PROT SERPL-MCNC: 8.2 G/DL (ref 6.4–8.2)
RBC # BLD AUTO: 5.6 MILLION/UL (ref 3.88–5.62)
SODIUM SERPL-SCNC: 141 MMOL/L (ref 136–145)
TROPONIN I SERPL-MCNC: <0.02 NG/ML
TROPONIN I SERPL-MCNC: <0.02 NG/ML
WBC # BLD AUTO: 7.66 THOUSAND/UL (ref 4.31–10.16)

## 2021-09-01 PROCEDURE — 96374 THER/PROPH/DIAG INJ IV PUSH: CPT

## 2021-09-01 PROCEDURE — 36415 COLL VENOUS BLD VENIPUNCTURE: CPT | Performed by: EMERGENCY MEDICINE

## 2021-09-01 PROCEDURE — 84484 ASSAY OF TROPONIN QUANT: CPT | Performed by: EMERGENCY MEDICINE

## 2021-09-01 PROCEDURE — 93005 ELECTROCARDIOGRAM TRACING: CPT

## 2021-09-01 PROCEDURE — 80053 COMPREHEN METABOLIC PANEL: CPT | Performed by: EMERGENCY MEDICINE

## 2021-09-01 PROCEDURE — 85379 FIBRIN DEGRADATION QUANT: CPT | Performed by: EMERGENCY MEDICINE

## 2021-09-01 PROCEDURE — 85025 COMPLETE CBC W/AUTO DIFF WBC: CPT | Performed by: EMERGENCY MEDICINE

## 2021-09-01 PROCEDURE — 99285 EMERGENCY DEPT VISIT HI MDM: CPT | Performed by: EMERGENCY MEDICINE

## 2021-09-01 PROCEDURE — 71045 X-RAY EXAM CHEST 1 VIEW: CPT

## 2021-09-01 PROCEDURE — 99285 EMERGENCY DEPT VISIT HI MDM: CPT

## 2021-09-01 RX ORDER — KETOROLAC TROMETHAMINE 30 MG/ML
15 INJECTION, SOLUTION INTRAMUSCULAR; INTRAVENOUS ONCE
Status: COMPLETED | OUTPATIENT
Start: 2021-09-01 | End: 2021-09-01

## 2021-09-01 RX ADMIN — KETOROLAC TROMETHAMINE 15 MG: 30 INJECTION, SOLUTION INTRAMUSCULAR; INTRAVENOUS at 17:47

## 2021-09-02 ENCOUNTER — EMERGENCY (EMERGENCY)
Facility: HOSPITAL | Age: 36
LOS: 1 days | Discharge: LEFT BEFORE DISCHARGE | End: 2021-09-02
Admitting: EMERGENCY MEDICINE
Payer: COMMERCIAL

## 2021-09-02 VITALS
RESPIRATION RATE: 16 BRPM | OXYGEN SATURATION: 100 % | DIASTOLIC BLOOD PRESSURE: 73 MMHG | SYSTOLIC BLOOD PRESSURE: 121 MMHG | TEMPERATURE: 98 F | HEIGHT: 71 IN | HEART RATE: 83 BPM

## 2021-09-02 LAB
ATRIAL RATE: 70 BPM
ATRIAL RATE: 78 BPM
P AXIS: 32 DEGREES
P AXIS: 40 DEGREES
PR INTERVAL: 168 MS
PR INTERVAL: 174 MS
QRS AXIS: -33 DEGREES
QRS AXIS: -34 DEGREES
QRSD INTERVAL: 100 MS
QRSD INTERVAL: 98 MS
QT INTERVAL: 376 MS
QT INTERVAL: 418 MS
QTC INTERVAL: 420 MS
QTC INTERVAL: 435 MS
T WAVE AXIS: 26 DEGREES
T WAVE AXIS: 42 DEGREES
VENTRICULAR RATE: 65 BPM
VENTRICULAR RATE: 75 BPM

## 2021-09-02 PROCEDURE — L9991: CPT

## 2021-09-02 PROCEDURE — 93010 ELECTROCARDIOGRAM REPORT: CPT | Performed by: INTERNAL MEDICINE

## 2021-09-02 NOTE — DISCHARGE INSTRUCTIONS
Please make sure to follow-up with your family doctor and cardiologist   Return to ER if feeling worse

## 2021-09-02 NOTE — ED PROVIDER NOTES
History  Chief Complaint   Patient presents with    Chest Pain     Pt reports chest pain since this morning  Pt report worsening pain with deep breaths  Pt denies any n/v  History provided by:  Patient   used: No    Chest Pain  Associated symptoms: no abdominal pain, no cough, no diaphoresis, no dizziness, no fever, no headache, no nausea, no palpitations, no shortness of breath, not vomiting and no weakness      Patient is a 44-year-old male presenting to emergency department left-sided chest pain  Started today morning  Constant  Worse with deep breaths  No nausea vomiting  No diaphoresis  No shortness of breath  No abdominal pain  No back pain  Was lightheaded 2 days ago  Resolved  No calf pain or swelling  No history of blood clots  No PE or DVT risk factors  Smokes extremely rarely, few cigarettes per year  Social alcohol use  No drugs  No history of hypertension diabetes or hyperlipidemia  Dad had a heart attack at 58  Patient states he has a leaky valve and follows up with cardiologist in [place]  MDM will do cardiac evaluation, Toradol, chest x-ray, D-dimer      None       No past medical history on file  No past surgical history on file  No family history on file  I have reviewed and agree with the history as documented  E-Cigarette/Vaping     E-Cigarette/Vaping Substances     Social History     Tobacco Use    Smoking status: Current Some Day Smoker     Types: Cigarettes    Smokeless tobacco: Never Used    Tobacco comment: Pt reports smokes one or twice a year   Substance Use Topics    Alcohol use: Yes     Comment: occationally     Drug use: Never       Review of Systems   Constitutional: Negative for chills, diaphoresis and fever  HENT: Negative for congestion and sore throat  Respiratory: Negative for cough, shortness of breath, wheezing and stridor  Cardiovascular: Positive for chest pain   Negative for palpitations and leg swelling  Gastrointestinal: Negative for abdominal pain, blood in stool, diarrhea, nausea and vomiting  Genitourinary: Negative for dysuria, frequency and urgency  Musculoskeletal: Negative for neck pain and neck stiffness  Skin: Negative for pallor and rash  Neurological: Positive for light-headedness  Negative for dizziness, syncope, weakness and headaches  All other systems reviewed and are negative  Physical Exam  Physical Exam  Vitals reviewed  Constitutional:       Appearance: He is well-developed  HENT:      Head: Normocephalic and atraumatic  Eyes:      Pupils: Pupils are equal, round, and reactive to light  Cardiovascular:      Rate and Rhythm: Normal rate and regular rhythm  Heart sounds: Normal heart sounds  Pulmonary:      Effort: Pulmonary effort is normal  No respiratory distress  Breath sounds: Normal breath sounds  Abdominal:      General: Bowel sounds are normal       Palpations: Abdomen is soft  Tenderness: There is no abdominal tenderness  Musculoskeletal:         General: Normal range of motion  Cervical back: Neck supple  Right lower leg: No tenderness  No edema  Left lower leg: No tenderness  No edema  Skin:     General: Skin is warm and dry  Capillary Refill: Capillary refill takes less than 2 seconds  Neurological:      General: No focal deficit present  Mental Status: He is alert and oriented to person, place, and time           Vital Signs  ED Triage Vitals   Temperature Pulse Respirations Blood Pressure SpO2   09/01/21 1656 09/01/21 1656 09/01/21 1656 09/01/21 1656 09/01/21 1656   98 7 °F (37 1 °C) 76 18 136/85 98 %      Temp Source Heart Rate Source Patient Position - Orthostatic VS BP Location FiO2 (%)   09/01/21 1656 09/01/21 1656 09/01/21 1656 09/01/21 1656 --   Oral Monitor Sitting Left arm       Pain Score       09/01/21 1653       8           Vitals:    09/01/21 1656 09/01/21 1800 09/01/21 2000 09/01/21 2100 BP: 136/85 130/73 131/71 132/72   Pulse: 76 70 74 64   Patient Position - Orthostatic VS: Sitting Lying Lying Lying         Visual Acuity      ED Medications  Medications   ketorolac (TORADOL) injection 15 mg (15 mg Intravenous Given 9/1/21 1747)       Diagnostic Studies  Results Reviewed     Procedure Component Value Units Date/Time    Troponin I [[id number]]  (Normal) Collected: 09/01/21 2010    Lab Status: Final result Specimen: Blood from Arm, Right Updated: 09/01/21 2048     Troponin I <0 02 ng/mL     Comprehensive metabolic panel [[id number]]  (Abnormal) Collected: 09/01/21 1712    Lab Status: Final result Specimen: Blood from Arm, Right Updated: 09/01/21 1750     Sodium 141 mmol/L      Potassium 4 2 mmol/L      Chloride 103 mmol/L      CO2 26 mmol/L      ANION GAP 12 mmol/L      BUN 15 mg/dL      Creatinine 1 28 mg/dL      Glucose 103 mg/dL      Calcium 9 6 mg/dL      AST 42 U/L       U/L      Alkaline Phosphatase 72 U/L      Total Protein 8 2 g/dL      Albumin 4 1 g/dL      Total Bilirubin 0 27 mg/dL      eGFR 73 ml/min/1 73sq m     Narrative:      [place] guidelines for Chronic Kidney Disease (CKD):     Stage 1 with normal or high GFR (GFR > 90 mL/min/1 73 square meters)    Stage 2 Mild CKD (GFR = 60-89 mL/min/1 73 square meters)    Stage 3A Moderate CKD (GFR = 45-59 mL/min/1 73 square meters)    Stage 3B Moderate CKD (GFR = 30-44 mL/min/1 73 square meters)    Stage 4 Severe CKD (GFR = 15-29 mL/min/1 73 square meters)    Stage 5 End Stage CKD (GFR <15 mL/min/1 73 square meters)  Note: GFR calculation is accurate only with a steady state creatinine    Troponin I [[id number]]  (Normal) Collected: 09/01/21 1712    Lab Status: Final result Specimen: Blood from Arm, Right Updated: 09/01/21 1750     Troponin I <0 02 ng/mL     D-Dimer [[id number]]  (Normal) Collected: 09/01/21 1712    Lab Status: Final result Specimen: Blood from Arm, Right Updated: 09/01/21 1747     D-Dimer, Quant <0 27 ug/ml FEU     CBC and differential [[id number]]  (Abnormal) Collected: 09/01/21 1712    Lab Status: Final result Specimen: Blood from Arm, Right Updated: 09/01/21 1724     WBC 7 66 Thousand/uL      RBC 5 60 Million/uL      Hemoglobin 14 7 g/dL      Hematocrit 45 0 %      MCV 80 fL      MCH 26 3 pg      MCHC 32 7 g/dL      RDW 13 6 %      MPV 10 4 fL      Platelets 146 Thousands/uL      nRBC 0 /100 WBCs      Neutrophils Relative 51 %      Immat GRANS % 0 %      Lymphocytes Relative 37 %      Monocytes Relative 8 %      Eosinophils Relative 3 %      Basophils Relative 1 %      Neutrophils Absolute 4 01 Thousands/µL      Immature Grans Absolute 0 03 Thousand/uL      Lymphocytes Absolute 2 80 Thousands/µL      Monocytes Absolute 0 58 Thousand/µL      Eosinophils Absolute 0 20 Thousand/µL      Basophils Absolute 0 04 Thousands/µL                  XR chest 1 view portable   Final Result by Wilfredo Hilliard MD (09/01 1840)      No acute cardiopulmonary disease  Workstation performed: [id number]                    Procedures  Procedures         ED Course  ED Course as of Sep 01 2244   Wed Sep 01, 2021   2118 Case discussed with cardiologist, Dr Zeny Chen, went over ECGs  Per Cardiology no ischemic signs  Okay for outpatient follow-up  HEART Risk Score      Most Recent Value   Heart Score Risk Calculator   History  0 Filed at: 09/01/2021 2119   ECG  1 Filed at: 09/01/2021 2119   Age  0 Filed at: 09/01/2021 2119   Risk Factors  1 Filed at: 09/01/2021 2119   Troponin  0 Filed at: 09/01/2021 2119   HEART Score  2 Filed at: 09/01/2021 2119                      SBIRT 22yo+      Most Recent Value   SBIRT (25 yo +)   In order to provide better care to our patients, we are screening all of our patients for alcohol and drug use  Would it be okay to ask you these screening questions? Yes Filed at: 09/01/2021 1714   Initial Alcohol Screen: US AUDIT-C    1   How often do you have a drink containing alcohol? 3 Filed at: 09/01/2021 1714   2  How many drinks containing alcohol do you have on a typical day you are drinking? 0 Filed at: 09/01/2021 1714   3a  Male UNDER 65: How often do you have five or more drinks on one occasion? 0 Filed at: 09/01/2021 1714   Audit-C Score  3 Filed at: 09/01/2021 1714   SOLOMON: How many times in the past year have you    Used an illegal drug or used a prescription medication for non-medical reasons? Never Filed at: 09/01/2021 1714                    MDM     Initial EKG showed rate of 75, sinus, left axis deviation, concern for LVH, nonspecific ST and T-waves, independently interpreted by me    Repeat EKG shows rate of 65, sinus, left axis deviation, concern for LVH, nonspecific ST and T-waves, U waves noted  Repeat troponin is normal     Discussed case with Cardiology  Went over EKGs  She evaluated both  No signs of ischemia  Repeat troponin is negative  Okay for outpatient follow-up  Discussed with patient  He knows he has to follow-up  Understands importance of it  Will return if worse  Disposition  Final diagnoses:   Chest pain     Time reflects when diagnosis was documented in both MDM as applicable and the Disposition within this note     Time User Action Codes Description Comment    9/1/2021  7:41 PM Lydia Yu Add [N99 846D] Closed fracture of left ankle, initial encounter     9/1/2021  7:43 PM Lydia Yu Remove [L86 [id number]] Closed fracture of left ankle, initial encounter     9/1/2021  9:19 PM Lydia Myrick Add [R07 9] Chest pain       ED Disposition     ED Disposition Condition Date/Time Comment    Discharge Stable Wed Sep 1, 2021  9:19 PM Temitope Zabala discharge to home/self care              Follow-up Information     Follow up With Specialties Details Why Contact Info Additional [address] Emergency Department Emergency Medicine  As needed, If symptoms worsen [address] [facility]  [phone] [facility], [address], [place], [place], [address]    Your cardiologist and family doctor  Call in 1 day Please call tomorrow and follow-up within 67 hours      [facility] Schedule an appointment as soon as possible for a visit   [address] [facility] [address] [phone]  [address] Cardiology [address] [place], [place], [place] [address]    [address]  Call  Call to get a family doctor [phone]             There are no discharge medications for this patient  No discharge procedures on file      PDMP Review     None          ED Provider  Electronically Signed by           Mat Stokes MD  09/01/21 3869

## 2021-09-03 NOTE — ED POST DISCHARGE NOTE - DETAILS
Pt LWOBE - reports he has persistent chest pain. Directed pt back to ED, via 911, for further evaluation and treatment.

## 2022-01-01 NOTE — ED SUB INTERN NOTE - CARDIOVASCULAR [-], MLM
no palpitations/no peripheral edema/no syncope 1st Trimester Sonogram/20 Week Level II Sonogram/Ultra Screen at 12 Weeks

## 2022-01-15 ENCOUNTER — HOSPITAL ENCOUNTER (INPATIENT)
Facility: HOSPITAL | Age: 37
LOS: 2 days | Discharge: HOME | DRG: 177 | End: 2022-01-17
Attending: EMERGENCY MEDICINE | Admitting: HOSPITALIST
Payer: COMMERCIAL

## 2022-01-15 ENCOUNTER — APPOINTMENT (EMERGENCY)
Dept: RADIOLOGY | Facility: HOSPITAL | Age: 37
DRG: 177 | End: 2022-01-15
Payer: COMMERCIAL

## 2022-01-15 DIAGNOSIS — J96.01 ACUTE HYPOXEMIC RESPIRATORY FAILURE DUE TO COVID-19 (HCC): ICD-10-CM

## 2022-01-15 DIAGNOSIS — U07.1 PNEUMONIA DUE TO COVID-19 VIRUS: Primary | ICD-10-CM

## 2022-01-15 DIAGNOSIS — J12.82 PNEUMONIA DUE TO COVID-19 VIRUS: Primary | ICD-10-CM

## 2022-01-15 DIAGNOSIS — R07.9 CHEST PAIN: ICD-10-CM

## 2022-01-15 DIAGNOSIS — U07.1 ACUTE HYPOXEMIC RESPIRATORY FAILURE DUE TO COVID-19 (HCC): ICD-10-CM

## 2022-01-15 DIAGNOSIS — E87.6 HYPOKALEMIA: ICD-10-CM

## 2022-01-15 PROBLEM — I35.1 AORTIC VALVE REGURGITATION: Chronic | Status: ACTIVE | Noted: 2022-01-15

## 2022-01-15 PROBLEM — R07.89 CHEST PRESSURE: Status: ACTIVE | Noted: 2022-01-15

## 2022-01-15 LAB
2HR DELTA HS TROPONIN: 0 NG/L
4HR DELTA HS TROPONIN: -4 NG/L
ALBUMIN SERPL BCP-MCNC: 2.9 G/DL (ref 3.5–5)
ALP SERPL-CCNC: 49 U/L (ref 46–116)
ALT SERPL W P-5'-P-CCNC: 106 U/L (ref 12–78)
ANION GAP SERPL CALCULATED.3IONS-SCNC: 7 MMOL/L (ref 4–13)
AST SERPL W P-5'-P-CCNC: 46 U/L (ref 5–45)
ATRIAL RATE: 88 BPM
BASOPHILS # BLD AUTO: 0.02 THOUSANDS/ΜL (ref 0–0.1)
BASOPHILS NFR BLD AUTO: 0 % (ref 0–1)
BILIRUB SERPL-MCNC: 0.31 MG/DL (ref 0.2–1)
BUN SERPL-MCNC: 13 MG/DL (ref 5–25)
CALCIUM ALBUM COR SERPL-MCNC: 9.5 MG/DL (ref 8.3–10.1)
CALCIUM SERPL-MCNC: 8.6 MG/DL (ref 8.3–10.1)
CARDIAC TROPONIN I PNL SERPL HS: 5 NG/L
CARDIAC TROPONIN I PNL SERPL HS: 9 NG/L
CARDIAC TROPONIN I PNL SERPL HS: 9 NG/L
CHLORIDE SERPL-SCNC: 103 MMOL/L (ref 100–108)
CK SERPL-CCNC: 59 U/L (ref 39–308)
CO2 SERPL-SCNC: 27 MMOL/L (ref 21–32)
CREAT SERPL-MCNC: 1.01 MG/DL (ref 0.6–1.3)
CRP SERPL HS-MCNC: 48.75 MG/L
CRP SERPL QL: 48.8 MG/L
D DIMER PPP FEU-MCNC: 0.36 UG/ML FEU
EOSINOPHIL # BLD AUTO: 0.02 THOUSAND/ΜL (ref 0–0.61)
EOSINOPHIL NFR BLD AUTO: 0 % (ref 0–6)
ERYTHROCYTE [DISTWIDTH] IN BLOOD BY AUTOMATED COUNT: 13.9 % (ref 11.6–15.1)
GFR SERPL CREATININE-BSD FRML MDRD: 96 ML/MIN/1.73SQ M
GLUCOSE SERPL-MCNC: 119 MG/DL (ref 65–140)
HCT VFR BLD AUTO: 42.7 % (ref 36.5–49.3)
HGB BLD-MCNC: 14.2 G/DL (ref 12–17)
IMM GRANULOCYTES # BLD AUTO: 0.03 THOUSAND/UL (ref 0–0.2)
IMM GRANULOCYTES NFR BLD AUTO: 0 % (ref 0–2)
LYMPHOCYTES # BLD AUTO: 0.99 THOUSANDS/ΜL (ref 0.6–4.47)
LYMPHOCYTES NFR BLD AUTO: 12 % (ref 14–44)
MCH RBC QN AUTO: 26.3 PG (ref 26.8–34.3)
MCHC RBC AUTO-ENTMCNC: 33.3 G/DL (ref 31.4–37.4)
MCV RBC AUTO: 79 FL (ref 82–98)
MONOCYTES # BLD AUTO: 0.4 THOUSAND/ΜL (ref 0.17–1.22)
MONOCYTES NFR BLD AUTO: 5 % (ref 4–12)
NEUTROPHILS # BLD AUTO: 6.69 THOUSANDS/ΜL (ref 1.85–7.62)
NEUTS SEG NFR BLD AUTO: 83 % (ref 43–75)
NRBC BLD AUTO-RTO: 0 /100 WBCS
NT-PROBNP SERPL-MCNC: 57 PG/ML
P AXIS: 23 DEGREES
PLATELET # BLD AUTO: 224 THOUSANDS/UL (ref 149–390)
PMV BLD AUTO: 9.5 FL (ref 8.9–12.7)
POTASSIUM SERPL-SCNC: 3.4 MMOL/L (ref 3.5–5.3)
PR INTERVAL: 158 MS
PROCALCITONIN SERPL-MCNC: 0.05 NG/ML
PROT SERPL-MCNC: 7.3 G/DL (ref 6.4–8.2)
QRS AXIS: -33 DEGREES
QRSD INTERVAL: 102 MS
QT INTERVAL: 352 MS
QTC INTERVAL: 425 MS
RBC # BLD AUTO: 5.39 MILLION/UL (ref 3.88–5.62)
SODIUM SERPL-SCNC: 137 MMOL/L (ref 136–145)
T WAVE AXIS: 10 DEGREES
VENTRICULAR RATE: 88 BPM
WBC # BLD AUTO: 8.15 THOUSAND/UL (ref 4.31–10.16)

## 2022-01-15 PROCEDURE — 93010 ELECTROCARDIOGRAM REPORT: CPT | Performed by: INTERNAL MEDICINE

## 2022-01-15 PROCEDURE — 99285 EMERGENCY DEPT VISIT HI MDM: CPT

## 2022-01-15 PROCEDURE — 80053 COMPREHEN METABOLIC PANEL: CPT | Performed by: PHYSICIAN ASSISTANT

## 2022-01-15 PROCEDURE — 84145 PROCALCITONIN (PCT): CPT

## 2022-01-15 PROCEDURE — 86141 C-REACTIVE PROTEIN HS: CPT | Performed by: PHYSICIAN ASSISTANT

## 2022-01-15 PROCEDURE — 85025 COMPLETE CBC W/AUTO DIFF WBC: CPT | Performed by: PHYSICIAN ASSISTANT

## 2022-01-15 PROCEDURE — 93005 ELECTROCARDIOGRAM TRACING: CPT

## 2022-01-15 PROCEDURE — 82550 ASSAY OF CK (CPK): CPT | Performed by: PHYSICIAN ASSISTANT

## 2022-01-15 PROCEDURE — 71045 X-RAY EXAM CHEST 1 VIEW: CPT

## 2022-01-15 PROCEDURE — 83880 ASSAY OF NATRIURETIC PEPTIDE: CPT | Performed by: PHYSICIAN ASSISTANT

## 2022-01-15 PROCEDURE — 86140 C-REACTIVE PROTEIN: CPT

## 2022-01-15 PROCEDURE — 99223 1ST HOSP IP/OBS HIGH 75: CPT | Performed by: INTERNAL MEDICINE

## 2022-01-15 PROCEDURE — 85379 FIBRIN DEGRADATION QUANT: CPT | Performed by: PHYSICIAN ASSISTANT

## 2022-01-15 PROCEDURE — 84484 ASSAY OF TROPONIN QUANT: CPT | Performed by: PHYSICIAN ASSISTANT

## 2022-01-15 PROCEDURE — 99285 EMERGENCY DEPT VISIT HI MDM: CPT | Performed by: PHYSICIAN ASSISTANT

## 2022-01-15 PROCEDURE — 36415 COLL VENOUS BLD VENIPUNCTURE: CPT | Performed by: PHYSICIAN ASSISTANT

## 2022-01-15 RX ORDER — BROMPHENIRAMINE MALEATE, PSEUDOEPHEDRINE HYDROCHLORIDE, AND DEXTROMETHORPHAN HYDROBROMIDE 2; 30; 10 MG/5ML; MG/5ML; MG/5ML
SYRUP ORAL
COMMUNITY
Start: 2022-01-12 | End: 2022-01-17 | Stop reason: HOSPADM

## 2022-01-15 RX ORDER — DEXAMETHASONE SODIUM PHOSPHATE 4 MG/ML
6 INJECTION, SOLUTION INTRA-ARTICULAR; INTRALESIONAL; INTRAMUSCULAR; INTRAVENOUS; SOFT TISSUE EVERY 24 HOURS
Status: DISCONTINUED | OUTPATIENT
Start: 2022-01-15 | End: 2022-01-17 | Stop reason: HOSPADM

## 2022-01-15 RX ORDER — ALBUTEROL SULFATE 90 UG/1
2 AEROSOL, METERED RESPIRATORY (INHALATION) EVERY 4 HOURS PRN
COMMUNITY
Start: 2022-01-12 | End: 2022-01-17 | Stop reason: HOSPADM

## 2022-01-15 RX ORDER — POTASSIUM CHLORIDE 20 MEQ/1
20 TABLET, EXTENDED RELEASE ORAL ONCE
Status: COMPLETED | OUTPATIENT
Start: 2022-01-15 | End: 2022-01-15

## 2022-01-15 RX ORDER — PREDNISONE 50 MG/1
50 TABLET ORAL DAILY
COMMUNITY
Start: 2022-01-12 | End: 2022-01-17 | Stop reason: HOSPADM

## 2022-01-15 RX ORDER — BENZONATATE 100 MG/1
100 CAPSULE ORAL 3 TIMES DAILY PRN
Status: DISCONTINUED | OUTPATIENT
Start: 2022-01-15 | End: 2022-01-17

## 2022-01-15 RX ORDER — LORATADINE AND PSEUDOEPHEDRINE SULFATE 10; 240 MG/1; MG/1
1 TABLET, EXTENDED RELEASE ORAL DAILY
COMMUNITY
Start: 2022-01-12 | End: 2022-01-17 | Stop reason: HOSPADM

## 2022-01-15 RX ORDER — MAGNESIUM HYDROXIDE/ALUMINUM HYDROXICE/SIMETHICONE 120; 1200; 1200 MG/30ML; MG/30ML; MG/30ML
30 SUSPENSION ORAL EVERY 6 HOURS PRN
Status: DISCONTINUED | OUTPATIENT
Start: 2022-01-15 | End: 2022-01-17 | Stop reason: HOSPADM

## 2022-01-15 RX ORDER — LIDOCAINE 40 MG/G
CREAM TOPICAL DAILY PRN
Status: DISCONTINUED | OUTPATIENT
Start: 2022-01-15 | End: 2022-01-17 | Stop reason: HOSPADM

## 2022-01-15 RX ORDER — NICOTINE 21 MG/24HR
1 PATCH, TRANSDERMAL 24 HOURS TRANSDERMAL DAILY
Status: DISCONTINUED | OUTPATIENT
Start: 2022-01-16 | End: 2022-01-15

## 2022-01-15 RX ORDER — ACETAMINOPHEN 325 MG/1
650 TABLET ORAL EVERY 6 HOURS PRN
Status: DISCONTINUED | OUTPATIENT
Start: 2022-01-15 | End: 2022-01-17 | Stop reason: HOSPADM

## 2022-01-15 RX ORDER — ACETAMINOPHEN 325 MG/1
650 TABLET ORAL ONCE
Status: COMPLETED | OUTPATIENT
Start: 2022-01-15 | End: 2022-01-15

## 2022-01-15 RX ADMIN — DEXAMETHASONE SODIUM PHOSPHATE 6 MG: 4 INJECTION, SOLUTION INTRAMUSCULAR; INTRAVENOUS at 18:40

## 2022-01-15 RX ADMIN — REMDESIVIR 200 MG: 100 INJECTION, POWDER, LYOPHILIZED, FOR SOLUTION INTRAVENOUS at 18:40

## 2022-01-15 RX ADMIN — ACETAMINOPHEN 650 MG: 325 TABLET, FILM COATED ORAL at 14:16

## 2022-01-15 RX ADMIN — POTASSIUM CHLORIDE 20 MEQ: 1500 TABLET, EXTENDED RELEASE ORAL at 15:02

## 2022-01-15 RX ADMIN — ENOXAPARIN SODIUM 30 MG: 30 INJECTION SUBCUTANEOUS at 20:02

## 2022-01-15 NOTE — H&P
[facility]  H&P- Lori Vickers 1985, 31 y o  male MRN: [id number]  Unit/Bed#: ED 28 Encounter: [id number]  Primary Care Provider: No primary care provider on file  Date and time admitted to hospital: 1/15/2022  1:50 PM    * COVID-19  Assessment & Plan  · COVID symptoms began 2 weeks ago  · + on 1/12  · Only remaining symptoms: cough, dyspnea on exertion, now with chest pressure with deep inspiration and coughing  · See plan chest pressure  · Mild protocol  · 2L NC    Plan:  · Remdesivir  · Dexamethasone  · ppx lovenox for AC  · Tessalon perles  · Can add nebs as necessary    Chest pressure  Assessment & Plan  · Patient presents due to substernal chest pressure mostly with deep inhalation or cough  Patient has had COVID symptoms for the past 2 weeks, with dyspnea on exertion and cough as the most pressing symptoms at this time  Pressure is not related to rest/exertion, reproducible on exam  · D-dimer negative  · EKG shows T-wave inversions in leads 3, V5, V6 as well as signs of hypertrophy  · Chest x-ray shows patchy infiltrates bilaterally suggesting COVID pneumonia  · Initial troponin are negative: 9    Likely musculoskeletal strain from coughing, does not seem like ACS  If patient has worsening chest tightness with increasing oxygen requirements, can consider CTPE to r/o PE  Plan:  Mild Covid protocol  Acetaminophen prn for pain  Asper creme prn    Hypokalemia  Assessment & Plan  Recent Labs     01/15/22  1411   K 3 4*     ED gave 20 mEq-- patient has been taking albuterol prescribed by ED    Plan:  Monitor and replete as necessary    Aortic valve regurgitation  Assessment & Plan  Hx of aortic regurgitation, last echo in 2019- "Moderate to severe regurgitation" with valve thickening   EF 55%    Plan:  Monitor for lightheadedness  If worsening SOB can consider repeating Echo      VTE Pharmacologic Prophylaxis: VTE Score: 4 Moderate Risk (Score 3-4) - Pharmacological DVT Prophylaxis Ordered: enoxaparin (Lovenox)  Code Status: Level 1 - Full Code   Discussion with family: Patient declined call to   Anticipated Length of Stay: Patient will be admitted on an inpatient basis with an anticipated length of stay of greater than 2 midnights secondary to chest pain/oxygen requirements  Chief Complaint: chest pressure and dyspnea on exertion    History of Present Illness:  Del Higgins is a 31 y o  male with a pmhx significant only for aortic valve regurgitation presents due to chest pressure and shortness of breath with exertion  Patient is unvaccinated against COVID-19, tested positive on 01/12 but symptoms have been ongoing for 2 weeks  He had been prescribed prednisone, azithromycin, albuterol and cough suppressants from urgent care  His symptoms have included body aches, fevers, diarrhea, cough  Most have resolved other than cough and the chest pressure he came to the ED for  He feels substernal pressure when trying to take a deep breath and while coughing, it is reproducible on palpation  He denies dizziness, lightheadedness, syncope, positional changes, and it is present both at rest and on exertion  In the ED, patient desaturated to 88% on room air on ambulation but is satting well during rest   Other vitals are stable  D-dimer is not elevated, and other labs are unremarkable  Chest x-ray official read pending however appears with bilateral infiltrates suggestive of COVID  EKG showed T-wave inversions in leads III, V5, V6  Initial troponin negative  Review of Systems:  Review of Systems   Constitutional: Positive for diaphoresis, fatigue and fever  Negative for chills  Resolving   HENT: Negative for ear pain and sore throat  Eyes: Negative for pain and visual disturbance  Respiratory: Positive for cough and shortness of breath  Cardiovascular: Positive for chest pain  Negative for palpitations and leg swelling     Gastrointestinal: Negative for abdominal pain, diarrhea and vomiting  Genitourinary: Negative for dysuria and hematuria  Musculoskeletal: Negative for arthralgias and back pain  Skin: Negative for color change and rash  Neurological: Negative for dizziness, seizures, syncope, weakness, light-headedness, numbness and headaches  All other systems reviewed and are negative  Past Medical and Surgical History:   Past Medical History:   Diagnosis Date    Aortic valve regurgitation        History reviewed  No pertinent surgical history  Meds/Allergies:  Prior to Admission medications    Medication Sig Start Date End Date Taking? Authorizing Provider   albuterol (PROVENTIL HFA,VENTOLIN HFA) 90 mcg/act inhaler Inhale 2 puffs every 4 (four) hours as needed 1/12/22 1/12/23 Yes Historical Provider, MD   brompheniramine-pseudoephedrine-DM 30-2-10 MG/5ML syrup Swallow 10ml qhs prn congestion/cough 1/12/22  Yes Historical Provider, MD   dextromethorphan-guaifenesin (MUCINEX DM)  MG per 12 hr tablet Take 2 tablets by mouth every morning 1/12/22  Yes Historical Provider, MD   loratadine-pseudoephedrine (Claritin-D 24 Hour)  mg per 24 hr tablet Take 1 tablet by mouth daily 1/12/22 1/22/22 Yes Historical Provider, MD   predniSONE 50 mg tablet Take 50 mg by mouth daily 1/12/22 1/17/22 Yes Historical Provider, MD     I have reviewed home medications with patient personally      Allergies: No Known Allergies    Social History:  Marital Status: /Civil Union   Patient Pre-hospital Living Situation: Home  Patient Pre-hospital Level of Mobility: walks  Patient Pre-hospital Diet Restrictions: none  Substance Use History:   Social History     Substance and Sexual Activity   Alcohol Use Yes    Comment: occationally      Social History     Tobacco Use   Smoking Status Current Some Day Smoker    Types: Cigarettes   Smokeless Tobacco Never Used   Tobacco Comment    Pt reports smokes one or twice a year     Social History Substance and Sexual Activity   Drug Use Never       Family History:  History reviewed  No pertinent family history  Physical Exam:     Vitals:   Blood Pressure: 130/66 (01/15/22 1600)  Pulse: 74 (01/15/22 1600)  Temperature: 98 9 °F (37 2 °C) (01/15/22 1359)  Temp Source: Oral (01/15/22 1359)  Respirations: 20 (01/15/22 1600)  Height: 5' 11" (180 3 cm) (01/15/22 1356)  Weight - Scale: 99 8 kg (220 lb) (01/15/22 1356)  SpO2: 97 % (01/15/22 1600)    Physical Exam  Vitals and nursing note reviewed  Constitutional:       General: He is not in acute distress  Interventions: Nasal cannula in place  HENT:      Head: Normocephalic and atraumatic  Mouth/Throat:      Mouth: Mucous membranes are moist       Pharynx: Oropharynx is clear  Eyes:      Extraocular Movements: Extraocular movements intact  Conjunctiva/sclera: Conjunctivae normal       Pupils: Pupils are equal, round, and reactive to light  Cardiovascular:      Rate and Rhythm: Normal rate and regular rhythm  Pulses: Normal pulses  Heart sounds: Normal heart sounds  No murmur heard  Pulmonary:      Effort: Pulmonary effort is normal  No respiratory distress  Comments: Decreased breath sounds, precluded by patient not inhaling deeply  Chest:      Chest wall: Tenderness present  Abdominal:      General: Abdomen is flat  Bowel sounds are normal  There is no distension  Palpations: Abdomen is soft  Tenderness: There is no abdominal tenderness  Musculoskeletal:         General: No swelling or tenderness  Cervical back: Neck supple  Right lower leg: No edema  Left lower leg: No edema  Skin:     General: Skin is warm and dry  Neurological:      Mental Status: He is alert and oriented to person, place, and time            Additional Data:     Lab Results:  Results from last 7 days   Lab Units 01/15/22  1411   WBC Thousand/uL 8 15   HEMOGLOBIN g/dL 14 2   HEMATOCRIT % 42 7   PLATELETS Thousands/uL 224   NEUTROS PCT % 83*   LYMPHS PCT % 12*   MONOS PCT % 5   EOS PCT % 0     Results from last 7 days   Lab Units 01/15/22  1411   SODIUM mmol/L 137   POTASSIUM mmol/L 3 4*   CHLORIDE mmol/L 103   CO2 mmol/L 27   BUN mg/dL 13   CREATININE mg/dL 1 01   ANION GAP mmol/L 7   CALCIUM mg/dL 8 6   ALBUMIN g/dL 2 9*   TOTAL BILIRUBIN mg/dL 0 31   ALK PHOS U/L 49   ALT U/L 106*   AST U/L 46*   GLUCOSE RANDOM mg/dL 119                       Imaging: Reviewed radiology reports from this admission including: xray(s)  XR chest 1 view portable   ED Interpretation by Mercedes Ortega PA-C (01/15 3795)   Bilateral pneumonia          EKG and Other Studies Reviewed on Admission:   · EKG: Sinus rhythm with T wave inversions in III, V5, V6 and signs of hypertrophy  ** Please Note: This note has been constructed using a voice recognition system   **

## 2022-01-15 NOTE — ASSESSMENT & PLAN NOTE
· COVID symptoms began 2 weeks ago  · + on 1/12  · Only remaining symptoms: cough, dyspnea on exertion, now with chest pressure with deep inspiration and coughing  · See plan chest pressure  · Mild protocol  · 2L NC    Plan:  · Remdesivir  · Dexamethasone  · ppx lovenox for AC  · Tessalon perles  · Can add nebs as necessary

## 2022-01-15 NOTE — ASSESSMENT & PLAN NOTE
· Patient presents due to substernal chest pressure mostly with deep inhalation or cough  Patient has had COVID symptoms for the past 2 weeks, with dyspnea on exertion and cough as the most pressing symptoms at this time  Pressure is not related to rest/exertion, reproducible on exam  · D-dimer negative  · EKG shows T-wave inversions in leads 3, V5, V6 as well as signs of hypertrophy  · Chest x-ray shows patchy infiltrates bilaterally suggesting COVID pneumonia  · Initial troponin are negative: 9    Likely musculoskeletal strain from coughing, does not seem like ACS  If patient has worsening chest tightness with increasing oxygen requirements, can consider CTPE to r/o PE       Plan:  Mild Covid protocol  Acetaminophen prn for pain  Asper creme prn

## 2022-01-15 NOTE — ASSESSMENT & PLAN NOTE
Hx of aortic regurgitation, last echo in 2019- "Moderate to severe regurgitation" with valve thickening   EF 55%    Plan:  Monitor for lightheadedness  If worsening SOB can consider repeating Echo

## 2022-01-15 NOTE — ASSESSMENT & PLAN NOTE
Recent Labs     01/15/22  1411   K 3 4*     ED gave 20 mEq-- patient has been taking albuterol prescribed by ED    Plan:  Monitor and replete as necessary

## 2022-01-15 NOTE — ED PROVIDER NOTES
History  Chief Complaint   Patient presents with    Medical Problem     Pt reports he was covid positive 2 weeks ago, retested negative but continues with SOB and chest heaviness      Thomas Whitmore is a 31 y o  male with past medical history of aortic valve regurgitation who presents to the ED with complaints of chest heaviness and SOB at rest which is worse with exertion over the past few days  Patient reports feeling winded walking up and down the stairs  Patient tested positive for COVID on 01/12/2022 but reports symptoms of cough and fever x 2 weeks  Patient has been utilizing albuterol inhaler, Mucinex, cough suppressants, azithromycin and prednisone for his symptoms  Patient is not vaccinated against COVID  Denies sick contacts  History provided by:  Patient  Chest Pain  Pain location:  Substernal area  Pain quality: pressure    Duration:  3 days  Associated symptoms: cough, fever and shortness of breath    Associated symptoms: no abdominal pain, no AICD problem, no altered mental status, no anorexia, no anxiety, no back pain, no claudication, no diaphoresis, no dizziness, no dysphagia, no fatigue, no headache, no heartburn, no nausea, no near-syncope, no numbness, no palpitations, no syncope, not vomiting and no weakness    Risk factors: aortic disease and male sex        None       Past Medical History:   Diagnosis Date    Aortic valve regurgitation        History reviewed  No pertinent surgical history  History reviewed  No pertinent family history  I have reviewed and agree with the history as documented      E-Cigarette/Vaping     E-Cigarette/Vaping Substances     Social History     Tobacco Use    Smoking status: Current Some Day Smoker     Types: Cigarettes    Smokeless tobacco: Never Used    Tobacco comment: Pt reports smokes one or twice a year   Substance Use Topics    Alcohol use: Yes     Comment: occationally     Drug use: Never       Review of Systems   Constitutional: Positive for fever  Negative for appetite change, chills, diaphoresis, fatigue and unexpected weight change  HENT: Negative for congestion, drooling, ear pain, rhinorrhea, sore throat, trouble swallowing and voice change  Eyes: Negative for pain, discharge, redness and visual disturbance  Respiratory: Positive for cough and shortness of breath  Negative for wheezing and stridor  MISHRA   Cardiovascular: Positive for chest pain  Negative for palpitations, claudication, leg swelling, syncope and near-syncope  Gastrointestinal: Negative for abdominal pain, anorexia, blood in stool, constipation, diarrhea, heartburn, nausea and vomiting  Genitourinary: Negative for dysuria, flank pain, frequency, hematuria and urgency  Musculoskeletal: Negative for back pain, gait problem, joint swelling, neck pain and neck stiffness  Skin: Negative for color change and rash  Neurological: Negative for dizziness, seizures, weakness, light-headedness, numbness and headaches  Physical Exam  Physical Exam  Vitals and nursing note reviewed  Constitutional:       General: He is not in acute distress  Appearance: He is well-developed  He is obese  He is not ill-appearing  Comments: Warm to touch   HENT:      Head: Normocephalic and atraumatic  Nose: Nose normal    Eyes:      Conjunctiva/sclera: Conjunctivae normal       Pupils: Pupils are equal, round, and reactive to light  Cardiovascular:      Rate and Rhythm: Regular rhythm  Tachycardia present  Pulmonary:      Effort: Pulmonary effort is normal       Breath sounds: Rhonchi present  Musculoskeletal:         General: Normal range of motion  Cervical back: Normal range of motion and neck supple  Skin:     General: Skin is warm and dry  Capillary Refill: Capillary refill takes less than 2 seconds  Neurological:      Mental Status: He is alert and oriented to person, place, and time           Vital Signs  ED Triage Vitals   Temperature Pulse Respirations Blood Pressure SpO2   01/15/22 1359 01/15/22 1356 01/15/22 1356 01/15/22 1356 01/15/22 1356   98 9 °F (37 2 °C) 97 20 136/80 94 %      Temp Source Heart Rate Source Patient Position - Orthostatic VS BP Location FiO2 (%)   01/15/22 1359 01/15/22 1356 01/15/22 1356 01/15/22 1356 --   Oral Monitor Lying Left arm       Pain Score       01/15/22 1356       No Pain           Vitals:    01/15/22 1356   BP: 136/80   Pulse: 97   Patient Position - Orthostatic VS: Lying         Visual Acuity      ED Medications  Medications   acetaminophen (TYLENOL) tablet 650 mg (650 mg Oral Given 1/15/22 1416)   potassium chloride (K-DUR,KLOR-CON) CR tablet 20 mEq (20 mEq Oral Given 1/15/22 1502)       Diagnostic Studies  Results Reviewed     Procedure Component Value Units Date/Time    D-dimer, quantitative [[id number]]  (Normal) Collected: 01/15/22 1416    Lab Status: Final result Specimen: Blood from Arm, Left Updated: 01/15/22 1521     D-Dimer, Quant 0 36 ug/ml FEU     NT-BNP PRO [[id number]]  (Normal) Collected: 01/15/22 1411    Lab Status: Final result Specimen: Blood from Hand, Left Updated: 01/15/22 1501     NT-proBNP 57 pg/mL     Comprehensive metabolic panel [[id number]]  (Abnormal) Collected: 01/15/22 1411    Lab Status: Final result Specimen: Blood from Hand, Left Updated: 01/15/22 1453     Sodium 137 mmol/L      Potassium 3 4 mmol/L      Chloride 103 mmol/L      CO2 27 mmol/L      ANION GAP 7 mmol/L      BUN 13 mg/dL      Creatinine 1 01 mg/dL      Glucose 119 mg/dL      Calcium 8 6 mg/dL      Corrected Calcium 9 5 mg/dL      AST 46 U/L       U/L      Alkaline Phosphatase 49 U/L      Total Protein 7 3 g/dL      Albumin 2 9 g/dL      Total Bilirubin 0 31 mg/dL      eGFR 96 ml/min/1 73sq m     Narrative:      [place] guidelines for Chronic Kidney Disease (CKD):     Stage 1 with normal or high GFR (GFR > 90 mL/min/1 73 square meters)    Stage 2 Mild CKD (GFR = 60-89 mL/min/1 73 square meters)    Stage 3A Moderate CKD (GFR = 45-59 mL/min/1 73 square meters)    Stage 3B Moderate CKD (GFR = 30-44 mL/min/1 73 square meters)    Stage 4 Severe CKD (GFR = 15-29 mL/min/1 73 square meters)    Stage 5 End Stage CKD (GFR <15 mL/min/1 73 square meters)  Note: GFR calculation is accurate only with a steady state creatinine    HS Troponin 0hr (reflex protocol) [[id number]]  (Normal) Collected: 01/15/22 1411    Lab Status: Final result Specimen: Blood from Hand, Left Updated: 01/15/22 1451     hs TnI 0hr 9 ng/L     HS Troponin I 2hr [[id number]]     Lab Status: No result Specimen: Blood     CK Total with Reflex CKMB [[id number]]     Lab Status: No result Specimen: Blood     High sensitivity CRP [[id number]]     Lab Status: No result Specimen: Blood     CBC and differential [[id number]]  (Abnormal) Collected: 01/15/22 1411    Lab Status: Final result Specimen: Blood from Hand, Left Updated: 01/15/22 1419     WBC 8 15 Thousand/uL      RBC 5 39 Million/uL      Hemoglobin 14 2 g/dL      Hematocrit 42 7 %      MCV 79 fL      MCH 26 3 pg      MCHC 33 3 g/dL      RDW 13 9 %      MPV 9 5 fL      Platelets 283 Thousands/uL      nRBC 0 /100 WBCs      Neutrophils Relative 83 %      Immat GRANS % 0 %      Lymphocytes Relative 12 %      Monocytes Relative 5 %      Eosinophils Relative 0 %      Basophils Relative 0 %      Neutrophils Absolute 6 69 Thousands/µL      Immature Grans Absolute 0 03 Thousand/uL      Lymphocytes Absolute 0 99 Thousands/µL      Monocytes Absolute 0 40 Thousand/µL      Eosinophils Absolute 0 02 Thousand/µL      Basophils Absolute 0 02 Thousands/µL                  XR chest 1 view portable   ED Interpretation by Carolyne Mina PA-C (01/15 1531)   Bilateral pneumonia                 Procedures  ECG 12 Lead Documentation Only    Date/Time: 1/15/2022 2:27 PM  Performed by: Carolyne Mina PA-C  Authorized by: Nikky Alicea MD     Indications / Diagnosis:  CP, SOB  Patient location:  ED  Rate:     ECG rate:  88    ECG rate assessment: normal    Rhythm:     Rhythm: sinus rhythm    QRS:     QRS axis:  Left  ST segments:     ST segments:  Normal  T waves:     T waves: inverted      Inverted:  V6, V5 and III  Comments:      QT/QTc 352/425  No prior EKG available for comparison  ED Course  ED Course as of 01/15/22 1558   Sat Aubrey 15, 2022   1439 Patient ambulated between 88-93%  1528 Case discussed with SLIM who is agreeable to admission                HEART Risk Score      Most Recent Value   Heart Score Risk Calculator    History 1 Filed at: 01/15/2022 1524   ECG 1 Filed at: 01/15/2022 1524   Age 0 Filed at: 01/15/2022 1524   Risk Factors 1 Filed at: 01/15/2022 1524   Troponin 0 Filed at: 01/15/2022 1524   HEART Score 3 Filed at: 01/15/2022 1524                PERC Rule for PE      Most Recent Value   PERC Rule for PE    Age >=50 0 Filed at: 01/15/2022 1524   HR >=100 0 Filed at: 01/15/2022 1524   O2 Sat on room air < 95% 1 Filed at: 01/15/2022 1524   History of PE or DVT 0 Filed at: 01/15/2022 1524   Recent trauma or surgery 0 Filed at: 01/15/2022 1524   Hemoptysis 0 Filed at: 01/15/2022 1524   Exogenous estrogen 0 Filed at: 01/15/2022 1524   Unilateral leg swelling 0 Filed at: 01/15/2022 1524   PERC Rule for PE Results 1 Filed at: 01/15/2022 1524                  Wells' Criteria for PE      Most Recent Value   Wells' Criteria for PE    Clinical signs and symptoms of DVT 0 Filed at: 01/15/2022 1524   PE is primary diagnosis or equally likely 0 Filed at: 01/15/2022 1524   HR >100 0 Filed at: 01/15/2022 1524   Immobilization at least 3 days or Surgery in the previous 4 weeks 0 Filed at: 01/15/2022 1524   Previous, objectively diagnosed PE or DVT 0 Filed at: 01/15/2022 1524   Hemoptysis 0 Filed at: 01/15/2022 1524   Malignancy with treatment within 6 months or palliative 0 Filed at: 01/15/2022 1524   Dileep' Criteria Total 0 Filed at: 01/15/2022 1524                [facility]  Number of Diagnoses or Management Options  Acute hypoxemic respiratory failure due to COVID-19 [facility]): new and requires workup  Chest pain: new and requires workup  Hypokalemia: new and requires workup  Pneumonia due to COVID-19 virus: new and requires workup  Diagnosis management comments: EKG with nonspecific T-wave inversions in V5, V6 and lead III  CXR concerning for bilateral pneumonia  Lab significant for elevated AST/ALT and hypokalemia which was orally replenished  D-Dimer 0 36  Patient ambulated with desaturations to 88%  Discussed with SLIM  We had a detailed discussion of the patient's condition and case, including need for admission   Accepts to his/her service   Bed request/bridging orders placed  Amount and/or Complexity of Data Reviewed  Clinical lab tests: reviewed and ordered  Tests in the radiology section of CPT®: ordered and reviewed  Review and summarize past medical records: yes  Discuss the patient with other providers: yes (Dr Mcneil Gowers - Ivette Yanez)    Patient Progress  Patient progress: stable      Disposition  Final diagnoses:   Pneumonia due to COVID-19 virus   Acute hypoxemic respiratory failure due to COVID-19 [facility])   Hypokalemia   Chest pain     Time reflects when diagnosis was documented in both MDM as applicable and the Disposition within this note     Time User Action Codes Description Comment    1/15/2022  3:06 PM Cam Neighbor Add [U07 1,  J12 82] Pneumonia due to COVID-19 virus     1/15/2022  3:06 PM Ha Cobb Add [U07 1,  J96 01] Acute hypoxemic respiratory failure due to COVID-19 ([facility] [address] )     1/15/2022  3:06 PM Cam Neighbor Add [E87 6] Hypokalemia     1/15/2022  3:07 PM Cam Neighbor Add [R07 9] Chest pain       ED Disposition     ED Disposition Condition Date/Time Comment    Admit Stable Sat Aubrey 15, 2022  3:31 PM Case was discussed with Dr Mcneil Gowers and the patient's admission status was agreed to be Admission Status: inpatient status to the service of Dr Mcneil Gowers  Follow-up Information    None         Patient's Medications    No medications on file       No discharge procedures on file      PDMP Review     None          ED Provider  Electronically Signed by           Amanda Rojas PA-C  01/15/22 0220

## 2022-01-16 ENCOUNTER — APPOINTMENT (INPATIENT)
Dept: NON INVASIVE DIAGNOSTICS | Facility: HOSPITAL | Age: 37
DRG: 177 | End: 2022-01-16
Payer: COMMERCIAL

## 2022-01-16 LAB
ALBUMIN SERPL BCP-MCNC: 2.9 G/DL (ref 3.5–5)
ALP SERPL-CCNC: 56 U/L (ref 46–116)
ALT SERPL W P-5'-P-CCNC: 169 U/L (ref 12–78)
ANION GAP SERPL CALCULATED.3IONS-SCNC: 11 MMOL/L (ref 4–13)
AST SERPL W P-5'-P-CCNC: 89 U/L (ref 5–45)
BILIRUB SERPL-MCNC: 0.37 MG/DL (ref 0.2–1)
BUN SERPL-MCNC: 12 MG/DL (ref 5–25)
CALCIUM ALBUM COR SERPL-MCNC: 9.9 MG/DL (ref 8.3–10.1)
CALCIUM SERPL-MCNC: 9 MG/DL (ref 8.3–10.1)
CHLORIDE SERPL-SCNC: 100 MMOL/L (ref 100–108)
CO2 SERPL-SCNC: 24 MMOL/L (ref 21–32)
CREAT SERPL-MCNC: 0.85 MG/DL (ref 0.6–1.3)
CRP SERPL QL: 74.2 MG/L
GFR SERPL CREATININE-BSD FRML MDRD: 113 ML/MIN/1.73SQ M
GLUCOSE SERPL-MCNC: 115 MG/DL (ref 65–140)
POTASSIUM SERPL-SCNC: 4.1 MMOL/L (ref 3.5–5.3)
PROCALCITONIN SERPL-MCNC: 0.05 NG/ML
PROT SERPL-MCNC: 7.5 G/DL (ref 6.4–8.2)
SODIUM SERPL-SCNC: 135 MMOL/L (ref 136–145)

## 2022-01-16 PROCEDURE — 99232 SBSQ HOSP IP/OBS MODERATE 35: CPT | Performed by: INTERNAL MEDICINE

## 2022-01-16 PROCEDURE — 93325 DOPPLER ECHO COLOR FLOW MAPG: CPT | Performed by: INTERNAL MEDICINE

## 2022-01-16 PROCEDURE — 84145 PROCALCITONIN (PCT): CPT

## 2022-01-16 PROCEDURE — 93308 TTE F-UP OR LMTD: CPT | Performed by: INTERNAL MEDICINE

## 2022-01-16 PROCEDURE — 80053 COMPREHEN METABOLIC PANEL: CPT

## 2022-01-16 PROCEDURE — 86140 C-REACTIVE PROTEIN: CPT

## 2022-01-16 PROCEDURE — 93306 TTE W/DOPPLER COMPLETE: CPT

## 2022-01-16 PROCEDURE — 93321 DOPPLER ECHO F-UP/LMTD STD: CPT | Performed by: INTERNAL MEDICINE

## 2022-01-16 PROCEDURE — XW033E5 INTRODUCTION OF REMDESIVIR ANTI-INFECTIVE INTO PERIPHERAL VEIN, PERCUTANEOUS APPROACH, NEW TECHNOLOGY GROUP 5: ICD-10-PCS | Performed by: INTERNAL MEDICINE

## 2022-01-16 RX ORDER — ALPRAZOLAM 0.25 MG/1
0.25 TABLET ORAL 2 TIMES DAILY PRN
Status: DISCONTINUED | OUTPATIENT
Start: 2022-01-16 | End: 2022-01-17 | Stop reason: HOSPADM

## 2022-01-16 RX ADMIN — ACETAMINOPHEN 650 MG: 325 TABLET, FILM COATED ORAL at 18:30

## 2022-01-16 RX ADMIN — REMDESIVIR 100 MG: 100 INJECTION, POWDER, LYOPHILIZED, FOR SOLUTION INTRAVENOUS at 17:09

## 2022-01-16 RX ADMIN — ENOXAPARIN SODIUM 30 MG: 30 INJECTION SUBCUTANEOUS at 10:04

## 2022-01-16 RX ADMIN — DEXAMETHASONE SODIUM PHOSPHATE 6 MG: 4 INJECTION, SOLUTION INTRAMUSCULAR; INTRAVENOUS at 17:09

## 2022-01-16 RX ADMIN — BENZONATATE 100 MG: 100 CAPSULE ORAL at 12:04

## 2022-01-16 RX ADMIN — ENOXAPARIN SODIUM 30 MG: 30 INJECTION SUBCUTANEOUS at 20:41

## 2022-01-16 RX ADMIN — ACETAMINOPHEN 650 MG: 325 TABLET, FILM COATED ORAL at 04:00

## 2022-01-16 NOTE — PLAN OF CARE
Problem: PAIN - ADULT  Goal: Verbalizes/displays adequate comfort level or baseline comfort level  Description: Interventions:  - Encourage patient to monitor pain and request assistance  - Assess pain using appropriate pain scale  - Administer analgesics based on type and severity of pain and evaluate response  - Implement non-pharmacological measures as appropriate and evaluate response  - Consider cultural and social influences on pain and pain management  - Notify physician/advanced practitioner if interventions unsuccessful or patient reports new pain  Outcome: Progressing     Problem: INFECTION - ADULT  Goal: Absence or prevention of progression during hospitalization  Description: INTERVENTIONS:  - Assess and monitor for signs and symptoms of infection  - Monitor lab/diagnostic results  - Monitor all insertion sites, i e  indwelling lines, tubes, and drains  - Monitor endotracheal if appropriate and nasal secretions for changes in amount and color  - [place] appropriate cooling/warming therapies per order  - Administer medications as ordered  - Instruct and encourage patient and family to use good hand hygiene technique  - Identify and instruct in appropriate isolation precautions for identified infection/condition  Outcome: Progressing     Problem: Knowledge Deficit  Goal: Patient/family/caregiver demonstrates understanding of disease process, treatment plan, medications, and discharge instructions  Description: Complete learning assessment and assess knowledge base    Interventions:  - Provide teaching at level of understanding  - Provide teaching via preferred learning methods  Outcome: Progressing

## 2022-01-16 NOTE — ASSESSMENT & PLAN NOTE
Recent Labs     01/15/22  1411 01/16/22  0501   K 3 4* 4 1     ED gave 20 mEq-- patient has been taking albuterol prescribed by ED    Plan:  Monitor and replete as necessary

## 2022-01-16 NOTE — UTILIZATION REVIEW
Initial Clinical Review    Admission: Date/Time/Statement:   Admission Orders (From admission, onward)     Ordered        01/15/22 1531  INPATIENT ADMISSION  Once                      Orders Placed This Encounter   Procedures    INPATIENT ADMISSION     Standing Status:   Standing     Number of Occurrences:   1     Order Specific Question:   Level of Care     Answer:   Med Surg [16]     Order Specific Question:   Estimated length of stay     Answer:   More than 2 Midnights     Order Specific Question:   Certification     Answer:   I certify that inpatient services are medically necessary for this patient for a duration of greater than two midnights  See H&P and MD Progress Notes for additional information about the patient's course of treatment  ED Arrival Information     Expected Arrival Acuity    - 1/15/2022 13:50 Urgent         Means of arrival Escorted by Service Admission type    [facility] Hospitalist Urgent         Arrival complaint    COVID        Chief Complaint   Patient presents with    Medical Problem     Pt reports he was covid positive 2 weeks ago, retested negative but continues with SOB and chest heaviness        Initial Presentation: 31 y o  male with a pmhx significant only for aortic valve regurgitation presents due to chest pressure and shortness of breath with exertion  Patient is unvaccinated against COVID-19, tested positive on 01/12 but symptoms have been ongoing for 2 weeks  Prescribed prednisone, azithromycin, albuterol and cough suppressants from urgent care with symptoms resolved except for ongoing cough, SOB, chest pressure  Pt reports substernal pressure when trying to take a deep breath and while coughing, it is reproducible on palpation  On exam, pt desat in ED to 88 %on RA with ambulation  O2 @ 2 l applied with sat 97 %  Pt with decreased breath sounds  EKG shows T-wave inversions in leads 3, V5, V6 as well as signs of hypertrophy   Labs- initial troponin 9 , potassium 3  4  D dimer neg  CRP 48 8Chest x-ray shows patchy infiltrates bilaterally suggesting COVID pneumonia  Pt given tylenol and potassium repletion in ED  Pt admitted as Inpatient to telemetry with COVID 19 and chest pressure-likely MSK strain  Plan - mild path with IV remdesivir, IV dexamethasone, Lovenox for AC, tessalon perles,supplemental O2, add nebs as needed, telemetry, Tylenol for pain , monitor and replete lytes as needed                ED Triage Vitals   Temperature Pulse Respirations Blood Pressure SpO2   01/15/22 1359 01/15/22 1356 01/15/22 1356 01/15/22 1356 01/15/22 1356   98 9 °F (37 2 °C) 97 20 136/80 94 %      Temp Source Heart Rate Source Patient Position - Orthostatic VS BP Location FiO2 (%)   01/15/22 1359 01/15/22 1356 01/15/22 1356 01/15/22 1356 --   Oral Monitor Lying Left arm       Pain Score       01/15/22 1356       No Pain          Wt Readings from Last 1 Encounters:   01/15/22 98 9 kg (218 lb 0 6 oz)     Additional Vital Signs:   Date/Time Temp Pulse Resp BP MAP (mmHg) SpO2 Calculated FIO2 (%) - Nasal Cannula Nasal Cannula O2 Flow Rate (L/min) O2 Device   01/16/22 1225 -- 77 -- 118/61 82 -- -- -- --   01/16/22 1205 -- -- -- -- -- 97 % 28 2 L/min Nasal cannula   01/16/22 0836 98 2 °F (36 8 °C) 71 18 132/67 94 95 % -- -- Nasal cannula   01/15/22 2227 98 4 °F (36 9 °C) 81 18 136/71 97 97 % -- -- --   01/15/22 1810 -- -- -- -- -- 93 % -- -- None (Room air)   01/15/22 1755 98 5 °F (36 9 °C) 77 19 139/76 102 94 % -- -- None (Room air)   01/15/22 1600 -- 74 20 130/66 89 97 % 28 2 L/min Nasal cannula   01/15/22 1445 -- -- -- -- -- 88 % Abnormal  -- -- --     Pertinent Labs/Diagnostic Test Results:   1/15   ECG-Normal sinus rhythm  Left axis deviation  Voltage criteria for left ventricular hypertrophy  Nonspecific T wave abnormality  CXRModerate bilateral ground glass opacity due to Covid-19   1/16 ECHO- inprogress      Contains abnormal data NOVEL CORONAVIRUS (COVID-19), PCR [facility]     Status: Final result     Next appt: None     Specimen type and source: Separate Accession, Nasopharynx (LAB)           Ref Range & Units 1/12/22 1102   SARS Coronavirus 2 PCR Not Detected Detected Abnormal                  Results from last 7 days   Lab Units 01/15/22  1411   WBC Thousand/uL 8 15   HEMOGLOBIN g/dL 14 2   HEMATOCRIT % 42 7   PLATELETS Thousands/uL 224   NEUTROS ABS Thousands/µL 6 69         Results from last 7 days   Lab Units 01/16/22  0501 01/15/22  1411   SODIUM mmol/L 135* 137   POTASSIUM mmol/L 4 1 3 4*   CHLORIDE mmol/L 100 103   CO2 mmol/L 24 27   ANION GAP mmol/L 11 7   BUN mg/dL 12 13   CREATININE mg/dL 0 85 1 01   EGFR ml/min/1 73sq m 113 96   CALCIUM mg/dL 9 0 8 6     Results from last 7 days   Lab Units 01/16/22  0501 01/15/22  1411   AST U/L 89* 46*   ALT U/L 169* 106*   ALK PHOS U/L 56 49   TOTAL PROTEIN g/dL 7 5 7 3   ALBUMIN g/dL 2 9* 2 9*   TOTAL BILIRUBIN mg/dL 0 37 0 31         Results from last 7 days   Lab Units 01/16/22  0501 01/15/22  1411   GLUCOSE RANDOM mg/dL 115 119           Results from last 7 days   Lab Units 01/15/22  1411   CK TOTAL U/L 59     Results from last 7 days   Lab Units 01/15/22  2225 01/15/22  1644 01/15/22  1411   HS TNI 0HR ng/L  --   --  9   HS TNI 2HR ng/L  --  9  --    HSTNI D2 ng/L  --  0  --    HS TNI 4HR ng/L 5  --   --    HSTNI D4 ng/L -4  --   --      Results from last 7 days   Lab Units 01/15/22  1416   D-DIMER QUANTITATIVE ug/ml FEU 0 36             Results from last 7 days   Lab Units 01/16/22  0501 01/15/22  1411   PROCALCITONIN ng/ml 0 05 0 05                 Results from last 7 days   Lab Units 01/15/22  1411   NT-PRO BNP pg/mL 57                 Results from last 7 days   Lab Units 01/16/22  0501 01/15/22  1411   CRP mg/L 74 2* 48 8*             ED Treatment:   Medication Administration from 01/15/2022 1349 to 01/15/2022 1754       Date/Time Order Dose Route Action     01/15/2022 1416 acetaminophen (TYLENOL) tablet 650 mg 650 mg Oral Given 01/15/2022 1502 potassium chloride (K-DUR,KLOR-CON) CR tablet 20 mEq 20 mEq Oral Given        Past Medical History:   Diagnosis Date    Aortic valve regurgitation      Present on Admission:  **None**      Admitting Diagnosis: Hypokalemia [E87 6]  Chest pain [R07 9]  Acute hypoxemic respiratory failure due to COVID-19 (HCC) [U07 1, J96 01]  Pneumonia due to COVID-19 virus [U07 1, J12 82]  COVID-19 [U07 1]  Age/Sex: 31 y o  male  Admission Orders:  Scheduled Medications:  dexamethasone, 6 mg, Intravenous, Q24H  enoxaparin, 30 mg, Subcutaneous, Q12H [facility]  remdesivir, 100 mg, Intravenous, Q24H  remdesivir (Veklury) 200 mg in sodium chloride 0 9 % 290 mL IVPB  Dose: 200 mg  Freq: Every 24 hours Route: IV  Indications of Use: INFECTION CAUSED BY 2019 NOVEL CORONAVIRUS  Start: 01/15/22 1702 End: 01/15/22 1910    Continuous IV Infusions:     PRN Meds:  acetaminophen, 650 mg, Oral, Q6H PRN x1 1/16  aluminum-magnesium hydroxide-simethicone, 30 mL, Oral, Q6H PRN  benzonatate, 100 mg, Oral, TID PRN x1 1/16  lidocaine, , Topical, Daily PRN      Telemetry'  [facility]    Network Utilization Review Department  ATTENTION: Please call with any questions or concerns to [phone] and carefully listen to the prompts so that you are directed to the right person  All voicemails are confidential   [facility] all requests for admission clinical reviews, approved or denied determinations and any other requests to dedicated fax number below belonging to the campus where the patient is receiving treatment   List of dedicated fax numbers for the Facilities:  [address] DENIALS (Administrative/Medical Necessity) [phone]   [address] (Maternity/NICU/Pediatrics) [phone]   [address] [place] [address], [facility] [address] [id number] [place] [address] Estelle Goyal [address] [facility] [place] [address] [phone]

## 2022-01-16 NOTE — PROGRESS NOTES
[facility]  Progress Note - Marcell Keating 1985, 31 y o  male MRN: [id number]  Unit/Bed#: S -01 Encounter: [id number]  Primary Care Provider: No primary care provider on file  Date and time admitted to hospital: 1/15/2022  1:50 PM    * COVID-19  Assessment & Plan  · COVID symptoms began 2 weeks ago  · + on 1/12  · Only remaining symptoms: cough, dyspnea on exertion, now with chest pressure with deep inspiration and coughing  · See plan chest pressure  · Mild protocol  · 2L NC    Plan:  · Remdesivir  · Dexamethasone  · ppx lovenox for AC  · Tessalon perles  · Can add nebs as necessary    Chest pressure  Assessment & Plan  · Patient presents due to substernal chest pressure mostly with deep inhalation or cough  Patient has had COVID symptoms for the past 2 weeks, with dyspnea on exertion and cough as the most pressing symptoms at this time  Pressure is not related to rest/exertion, reproducible on exam  · D-dimer negative  · EKG shows T-wave inversions in leads 3, V5, V6 as well as signs of hypertrophy  · Chest x-ray shows patchy infiltrates bilaterally suggesting COVID pneumonia  · Troponins negative    Likely musculoskeletal strain from coughing, does not seem like ACS  If patient has worsening chest tightness with increasing oxygen requirements, can consider CTPE to r/o PE  Plan:  Mild Covid protocol  Acetaminophen prn for pain  Asper creme prn  Consider anxiety as contributor to chest pressure/shortness of breath    Hypokalemia  Assessment & Plan  Recent Labs     01/15/22  1411 01/16/22  0501   K 3 4* 4 1     ED gave 20 mEq-- patient has been taking albuterol prescribed by ED    Plan:  Monitor and replete as necessary    Aortic valve regurgitation  Assessment & Plan  Hx of aortic regurgitation, last echo in 2019- "Moderate to severe regurgitation" with valve thickening   EF 55%    Plan:  -repeat echo, follow-up  -Monitor for lightheadedness        VTE Pharmacologic Prophylaxis: VTE Score: 4 Moderate Risk (Score 3-4) - Pharmacological DVT Prophylaxis Ordered: enoxaparin (Lovenox)  Patient Centered Rounds: I performed bedside rounds with nursing staff today  Discussions with Specialists or Other Care Team Provider:     Education and Discussions with Family / Patient: Patient declined call to   Current Length of Stay: 1 day(s)  Current Patient Status: Inpatient   Discharge Plan: Anticipate discharge in 24-48 hrs to home  Code Status: Level 1 - Full Code    Subjective:   Patient seen and examined at bedside  Patient complains dyspnea on exertion  He also complains of intermittent chest pressure located center of chest with no radiation  Patient has no other complaints  Consider anxiety contributing factor to his SOB/chest pressure  Objective:     Vitals:   Temp (24hrs), Av 5 °F (36 9 °C), Min:98 2 °F (36 8 °C), Max:98 9 °F (37 2 °C)    Temp:  [98 2 °F (36 8 °C)-98 9 °F (37 2 °C)] 98 2 °F (36 8 °C)  HR:  [71-97] 77  Resp:  [18-20] 18  BP: (118-139)/(61-80) 118/61  SpO2:  [88 %-97 %] 97 %  Body mass index is 30 41 kg/m²  Input and Output Summary (last 24 hours): Intake/Output Summary (Last 24 hours) at 2022 1312  Last data filed at 2022 0900  Gross per 24 hour   Intake 120 ml   Output --   Net 120 ml       Physical Exam:   Physical Exam  Vitals reviewed  Constitutional:       General: He is not in acute distress  Appearance: He is not toxic-appearing  Eyes:      Extraocular Movements: Extraocular movements intact  Conjunctiva/sclera: Conjunctivae normal    Cardiovascular:      Rate and Rhythm: Normal rate and regular rhythm  Pulses: Normal pulses  Heart sounds: Normal heart sounds  Pulmonary:      Effort: Pulmonary effort is normal  No respiratory distress  Breath sounds: Normal breath sounds  No wheezing        Comments: 2 L O2 nasal cannula  Abdominal:      General: Bowel sounds are normal  Palpations: Abdomen is soft  Musculoskeletal:         General: No swelling  Right lower leg: No edema  Left lower leg: No edema  Skin:     General: Skin is warm and dry  Neurological:      Mental Status: He is alert and oriented to person, place, and time  Mental status is at baseline     Psychiatric:         Mood and Affect: Mood normal          Behavior: Behavior normal           Additional Data:     Labs:  Results from last 7 days   Lab Units 01/15/22  1411   WBC Thousand/uL 8 15   HEMOGLOBIN g/dL 14 2   HEMATOCRIT % 42 7   PLATELETS Thousands/uL 224   NEUTROS PCT % 83*   LYMPHS PCT % 12*   MONOS PCT % 5   EOS PCT % 0     Results from last 7 days   Lab Units 01/16/22  0501   SODIUM mmol/L 135*   POTASSIUM mmol/L 4 1   CHLORIDE mmol/L 100   CO2 mmol/L 24   BUN mg/dL 12   CREATININE mg/dL 0 85   ANION GAP mmol/L 11   CALCIUM mg/dL 9 0   ALBUMIN g/dL 2 9*   TOTAL BILIRUBIN mg/dL 0 37   ALK PHOS U/L 56   ALT U/L 169*   AST U/L 89*   GLUCOSE RANDOM mg/dL 115                 Results from last 7 days   Lab Units 01/16/22  0501 01/15/22  1411   PROCALCITONIN ng/ml 0 05 0 05       Lines/Drains:  Invasive Devices  Report    Peripheral Intravenous Line            Peripheral IV 01/16/22 Distal;Right;Upper;Ventral (anterior) Arm <1 day                  Telemetry:  Telemetry Orders (From admission, onward)             48 Hour Telemetry Monitoring  (ED Bridging Orders Panel)  Continuous x 48 hours        References:    Telemetry Guidelines   Question:  Reason for 48 Hour Telemetry  Answer:  Acute MI, chest pain - R/O MI, or unstable angina                 Telemetry Reviewed: Normal Sinus Rhythm  Indication for Continued Telemetry Use: Acute MI/Unstable Angina/Rule out ACS             Imaging: Reviewed radiology reports from this admission including: chest xray    Recent Cultures (last 7 days):         Last 24 Hours Medication List:   Current Facility-Administered Medications   Medication Dose Route Frequency Provider Last Rate    acetaminophen  650 mg Oral Q6H PRN Myrle Bitter, DO      aluminum-magnesium hydroxide-simethicone  30 mL Oral Q6H PRN Myrle Bitter, DO      benzonatate  100 mg Oral TID PRN Myrle Bitter, DO      dexamethasone  6 mg Intravenous Q24H Myrle Bitter, DO      enoxaparin  30 mg Subcutaneous Q12H [address] Sherrie Samantha, DO      lidocaine   Topical Daily PRN Myrle Bitter, DO      remdesivir  100 mg Intravenous Q24H Myrle Bitter, DO          Today, Patient Was Seen By: Heath Benson DO    **Please Note: This note may have been constructed using a voice recognition system  **

## 2022-01-16 NOTE — ASSESSMENT & PLAN NOTE
Hx of aortic regurgitation, last echo in 2019- "Moderate to severe regurgitation" with valve thickening   EF 55%    Plan:  -repeat echo, follow-up  -Monitor for lightheadedness

## 2022-01-16 NOTE — ASSESSMENT & PLAN NOTE
· Patient presents due to substernal chest pressure mostly with deep inhalation or cough  Patient has had COVID symptoms for the past 2 weeks, with dyspnea on exertion and cough as the most pressing symptoms at this time  Pressure is not related to rest/exertion, reproducible on exam  · D-dimer negative  · EKG shows T-wave inversions in leads 3, V5, V6 as well as signs of hypertrophy  · Chest x-ray shows patchy infiltrates bilaterally suggesting COVID pneumonia  · Troponins negative    Likely musculoskeletal strain from coughing, does not seem like ACS  If patient has worsening chest tightness with increasing oxygen requirements, can consider CTPE to r/o PE       Plan:  Mild Covid protocol  Acetaminophen prn for pain  Asper creme prn  Consider anxiety as contributor to chest pressure/shortness of breath

## 2022-01-17 VITALS
OXYGEN SATURATION: 94 % | HEIGHT: 71 IN | TEMPERATURE: 97.7 F | RESPIRATION RATE: 19 BRPM | WEIGHT: 218 LBS | BODY MASS INDEX: 30.52 KG/M2 | HEART RATE: 78 BPM | DIASTOLIC BLOOD PRESSURE: 90 MMHG | SYSTOLIC BLOOD PRESSURE: 133 MMHG

## 2022-01-17 LAB
ALBUMIN SERPL BCP-MCNC: 3.2 G/DL (ref 3.5–5)
ALP SERPL-CCNC: 51 U/L (ref 46–116)
ALT SERPL W P-5'-P-CCNC: 163 U/L (ref 12–78)
ANION GAP SERPL CALCULATED.3IONS-SCNC: 11 MMOL/L (ref 4–13)
AST SERPL W P-5'-P-CCNC: 54 U/L (ref 5–45)
AV LVOT MEAN GRADIENT: 1 MMHG
AV LVOT PEAK GRADIENT: 3 MMHG
AV REGURGITATION PRESSURE HALF TIME: 0.45 MS
BILIRUB SERPL-MCNC: 0.35 MG/DL (ref 0.2–1)
BUN SERPL-MCNC: 16 MG/DL (ref 5–25)
CALCIUM ALBUM COR SERPL-MCNC: 10.2 MG/DL (ref 8.3–10.1)
CALCIUM SERPL-MCNC: 9.6 MG/DL (ref 8.3–10.1)
CHLORIDE SERPL-SCNC: 103 MMOL/L (ref 100–108)
CO2 SERPL-SCNC: 25 MMOL/L (ref 21–32)
CREAT SERPL-MCNC: 0.89 MG/DL (ref 0.6–1.3)
DOP CALC LVOT PEAK VEL VTI: 13.63 CM
DOP CALC LVOT PEAK VEL: 0.81 M/S
ERYTHROCYTE [DISTWIDTH] IN BLOOD BY AUTOMATED COUNT: 13.5 % (ref 11.6–15.1)
GFR SERPL CREATININE-BSD FRML MDRD: 111 ML/MIN/1.73SQ M
GLUCOSE SERPL-MCNC: 132 MG/DL (ref 65–140)
HCT VFR BLD AUTO: 45.7 % (ref 36.5–49.3)
HGB BLD-MCNC: 15.1 G/DL (ref 12–17)
MCH RBC QN AUTO: 26 PG (ref 26.8–34.3)
MCHC RBC AUTO-ENTMCNC: 33 G/DL (ref 31.4–37.4)
MCV RBC AUTO: 79 FL (ref 82–98)
PLATELET # BLD AUTO: 303 THOUSANDS/UL (ref 149–390)
PMV BLD AUTO: 9.7 FL (ref 8.9–12.7)
POTASSIUM SERPL-SCNC: 4 MMOL/L (ref 3.5–5.3)
PROT SERPL-MCNC: 8 G/DL (ref 6.4–8.2)
RBC # BLD AUTO: 5.8 MILLION/UL (ref 3.88–5.62)
SL CV AV DECELERATION TIME RETROGRADE: 1537 MS
SL CV AV PEAK GRADIENT RETROGRADE: 105 MMHG
SL CV LV EF: 65
SODIUM SERPL-SCNC: 139 MMOL/L (ref 136–145)
WBC # BLD AUTO: 6.13 THOUSAND/UL (ref 4.31–10.16)

## 2022-01-17 PROCEDURE — 85027 COMPLETE CBC AUTOMATED: CPT

## 2022-01-17 PROCEDURE — 80053 COMPREHEN METABOLIC PANEL: CPT

## 2022-01-17 PROCEDURE — 99239 HOSP IP/OBS DSCHRG MGMT >30: CPT

## 2022-01-17 RX ORDER — GUAIFENESIN/DEXTROMETHORPHAN 100-10MG/5
10 SYRUP ORAL EVERY 4 HOURS PRN
Status: DISCONTINUED | OUTPATIENT
Start: 2022-01-17 | End: 2022-01-17 | Stop reason: HOSPADM

## 2022-01-17 RX ORDER — SODIUM CHLORIDE 9 MG/ML
150 INJECTION, SOLUTION INTRAVENOUS CONTINUOUS
Status: DISCONTINUED | OUTPATIENT
Start: 2022-01-17 | End: 2022-01-17 | Stop reason: HOSPADM

## 2022-01-17 RX ORDER — BENZONATATE 100 MG/1
100 CAPSULE ORAL 3 TIMES DAILY
Status: DISCONTINUED | OUTPATIENT
Start: 2022-01-17 | End: 2022-01-17 | Stop reason: HOSPADM

## 2022-01-17 RX ORDER — DEXAMETHASONE 6 MG/1
6 TABLET ORAL
Qty: 10 TABLET | Refills: 0 | Status: SHIPPED | OUTPATIENT
Start: 2022-01-17 | End: 2022-01-22

## 2022-01-17 RX ORDER — DEXAMETHASONE 6 MG/1
6 TABLET ORAL 2 TIMES DAILY WITH MEALS
Qty: 10 TABLET | Refills: 0 | Status: SHIPPED | OUTPATIENT
Start: 2022-01-18 | End: 2022-01-17 | Stop reason: SDUPTHER

## 2022-01-17 RX ADMIN — ENOXAPARIN SODIUM 30 MG: 30 INJECTION SUBCUTANEOUS at 08:52

## 2022-01-17 RX ADMIN — GUAIFENESIN AND DEXTROMETHORPHAN 10 ML: 100; 10 SYRUP ORAL at 12:49

## 2022-01-17 RX ADMIN — REMDESIVIR 100 MG: 100 INJECTION, POWDER, LYOPHILIZED, FOR SOLUTION INTRAVENOUS at 16:07

## 2022-01-17 RX ADMIN — BENZONATATE 100 MG: 100 CAPSULE ORAL at 08:52

## 2022-01-17 RX ADMIN — DEXAMETHASONE SODIUM PHOSPHATE 6 MG: 4 INJECTION, SOLUTION INTRAMUSCULAR; INTRAVENOUS at 16:07

## 2022-01-17 RX ADMIN — BENZONATATE 100 MG: 100 CAPSULE ORAL at 16:06

## 2022-01-17 RX ADMIN — SODIUM CHLORIDE 150 ML/HR: 0.9 INJECTION, SOLUTION INTRAVENOUS at 08:53

## 2022-01-17 RX ADMIN — BENZONATATE 100 MG: 100 CAPSULE ORAL at 03:09

## 2022-01-17 NOTE — PLAN OF CARE
Problem: PAIN - ADULT  Goal: Verbalizes/displays adequate comfort level or baseline comfort level  Description: Interventions:  - Encourage patient to monitor pain and request assistance  - Assess pain using appropriate pain scale  - Administer analgesics based on type and severity of pain and evaluate response  - Implement non-pharmacological measures as appropriate and evaluate response  - Consider cultural and social influences on pain and pain management  - Notify physician/advanced practitioner if interventions unsuccessful or patient reports new pain  Outcome: Progressing     Problem: INFECTION - ADULT  Goal: Absence or prevention of progression during hospitalization  Description: INTERVENTIONS:  - Assess and monitor for signs and symptoms of infection  - Monitor lab/diagnostic results  - Monitor all insertion sites, i e  indwelling lines, tubes, and drains  - Monitor endotracheal if appropriate and nasal secretions for changes in amount and color  - [place] appropriate cooling/warming therapies per order  - Administer medications as ordered  - Instruct and encourage patient and family to use good hand hygiene technique  - Identify and instruct in appropriate isolation precautions for identified infection/condition  Outcome: Progressing     Problem: Knowledge Deficit  Goal: Patient/family/caregiver demonstrates understanding of disease process, treatment plan, medications, and discharge instructions  Description: Complete learning assessment and assess knowledge base    Interventions:  - Provide teaching at level of understanding  - Provide teaching via preferred learning methods  Outcome: Progressing

## 2022-01-17 NOTE — DISCHARGE INSTR - AVS FIRST PAGE
Dear Lori Vickers,     It was our pleasure to care for you here at [facility], [address]  It is our hope that we were always able to exceed the expected standards for your care during your stay  You were hospitalized due to COVID  You were cared for on the 3rd floor by Maria Teresa Hobbs DO under the service of Jacqueline Mckeon MD with the [facility] who covers for your primary care physician (PCP), No primary care provider on file  , while you were hospitalized  If you have any questions or concerns related to this hospitalization, you may contact us at [phone]  For follow up as well as any medication refills, we recommend that you follow up with your primary care physician  A registered nurse will reach out to you by phone within a few days after your discharge to answer any additional questions that you may have after going home  However, at this time we provide for you here, the most important instructions / recommendations at discharge:     Notable Medication Adjustments -   Dexamethasone 6mg x5 days  Robitussin as needed for cough  Testing Required after Discharge -   none  Important follow up information -   Please make an appointment with a primary care physician  Other Instructions -   If chest pressure worsens, please return to the emergency room  Please review this entire after visit summary as additional general instructions including medication list, appointments, activity, diet, any pertinent wound care, and other additional recommendations from your care team that may be provided for you        Sincerely,     Maria Teresa Hobbs DO

## 2022-01-17 NOTE — ASSESSMENT & PLAN NOTE
· COVID symptoms began 2 weeks ago  · + on 1/12  · Only remaining symptoms: cough, dyspnea on exertion, now with chest pressure with deep inspiration and coughing  · See plan chest pressure  · Mild protocol  · 2L NC    Plan:  · Remdesivir: will get a dose before discharge  · Dexamethasone-- will discharge with 6mg oral x5 days  · robitussin for cough

## 2022-01-17 NOTE — ASSESSMENT & PLAN NOTE
· Patient presents due to substernal chest pressure mostly with deep inhalation or cough  Patient has had COVID symptoms for the past 2 weeks, with dyspnea on exertion and cough as the most pressing symptoms at this time  Pressure is not related to rest/exertion, reproducible on exam  · D-dimer negative  · EKG shows T-wave inversions in leads 3, V5, V6 as well as signs of hypertrophy  · Chest x-ray shows patchy infiltrates bilaterally suggesting COVID pneumonia  · Troponins negative    Likely musculoskeletal strain from coughing      Plan:  Mild Covid protocol  Will d/c with robitussin  And if needed OTC topical lidocaine cream

## 2022-01-17 NOTE — DISCHARGE SUMMARY
[facility]  Discharge- Marily Hernandez 1985, 31 y o  male MRN: [id number]  Unit/Bed#: S -01 Encounter: [id number]  Primary Care Provider: No primary care provider on file  Date and time admitted to hospital: 1/15/2022  1:50 PM    * COVID-19  Assessment & Plan  · COVID symptoms began 2 weeks ago  · + on 1/12  · Only remaining symptoms: cough, dyspnea on exertion, now with chest pressure with deep inspiration and coughing  · See plan chest pressure  · Mild protocol  · 2L NC    Plan:  · Remdesivir: will get a dose before discharge  · Dexamethasone-- will discharge with 6mg oral x5 days  · robitussin for cough    Chest pressure  Assessment & Plan  · Patient presents due to substernal chest pressure mostly with deep inhalation or cough  Patient has had COVID symptoms for the past 2 weeks, with dyspnea on exertion and cough as the most pressing symptoms at this time  Pressure is not related to rest/exertion, reproducible on exam  · D-dimer negative  · EKG shows T-wave inversions in leads 3, V5, V6 as well as signs of hypertrophy  · Chest x-ray shows patchy infiltrates bilaterally suggesting COVID pneumonia  · Troponins negative    Likely musculoskeletal strain from coughing  Plan:  Mild Covid protocol  Will d/c with robitussin  And if needed OTC topical lidocaine cream    Aortic valve regurgitation  Assessment & Plan  Hx of aortic regurgitation, last echo in 2019- "Moderate to severe regurgitation" with valve thickening  EF 55%  Echo: EF 52%, normal systolic and diastolic function  Aortic regurgitation is still severe, with possible bicuspid valve  Can follow with outpatient cardiology        Medical Problems             Resolved Problems  Date Reviewed: 1/17/2022    None              Discharging Resident: Xiao Smith DO  Discharging Attending: Amilcar Villalobos MD  PCP: No primary care provider on file    Admission Date:   Admission Orders (From admission, onward)     Ordered        01/15/22 1531  INPATIENT ADMISSION  Once                      Discharge Date: 01/17/22    Consultations During Hospital Stay:  · none    Procedures Performed:   · Echo: EF 35%, systolic and diastolic function normal, moderate to severe aortic regurgitation with possible bicuspid valve  Significant Findings / Test Results:   · none    Incidental Findings:   · None     Test Results Pending at Discharge (will require follow up): · None     Outpatient Tests Requested:  · none    Complications:  None    Reason for Admission:  Chest pressure associated with COVID-19    Hospital Course:   Robby Robles is a 31 y o  male patient who originally presented to the hospital on 1/15/2022 due to chest pressure and dyspnea on exertion at home for the past 3-4 days prior to admission  Patient has been experiencing COVID symptoms for about 2 weeks, positive on 12th of January  Imaging was representative of COVID pneumonia, and EKG had some nonspecific T wave inversions  Troponin trend was negative  Was treated on the mild pathway for COVID with cough suppressants and topical lidocaine cream for chest soreness  Patient required minimal supplemental oxygen over the course of his stay  Was discharged with oral steroids  Please see above list of diagnoses and related plan for additional information  Condition at Discharge: good    Discharge Day Visit / Exam:   Subjective:  Patient feeling better today  He states that his chest pressure has improved and that his cough is less severe  He still feels short of breath with ambulation but oxygen saturations are good  Nursing reported some diarrhea overnight     Vitals: Blood Pressure: 133/90 (01/17/22 0700)  Pulse: 78 (01/17/22 0700)  Temperature: 97 7 °F (36 5 °C) (01/17/22 0700)  Temp Source: Oral (01/17/22 0700)  Respirations: 19 (01/17/22 0700)  Height: 5' 11" (180 3 cm) (01/16/22 1457)  Weight - Scale: 98 9 kg (218 lb) (01/16/22 1457)  SpO2: 94 % (01/17/22 1230)  Exam:   Physical Exam  Vitals and nursing note reviewed  Constitutional:       General: He is not in acute distress  HENT:      Mouth/Throat:      Mouth: Mucous membranes are moist       Pharynx: Oropharynx is clear  Eyes:      Extraocular Movements: Extraocular movements intact  Conjunctiva/sclera: Conjunctivae normal       Pupils: Pupils are equal, round, and reactive to light  Cardiovascular:      Rate and Rhythm: Normal rate and regular rhythm  Pulses: Normal pulses  Heart sounds: Normal heart sounds  No murmur heard  Pulmonary:      Effort: Pulmonary effort is normal  No respiratory distress  Breath sounds: Normal breath sounds  No wheezing or rhonchi  Chest:      Chest wall: Tenderness present  Abdominal:      General: Abdomen is flat  Bowel sounds are normal  There is no distension  Palpations: Abdomen is soft  Tenderness: There is no abdominal tenderness  Musculoskeletal:         General: No swelling or tenderness  Cervical back: Neck supple  Skin:     General: Skin is warm and dry  Neurological:      Mental Status: He is alert and oriented to person, place, and time  Psychiatric:         Mood and Affect: Mood normal          Behavior: Behavior normal          Discussion with Family: Patient declined call to   Discharge instructions/Information to patient and family:   See after visit summary for information provided to patient and family  Provisions for Follow-Up Care:  See after visit summary for information related to follow-up care and any pertinent home health orders  Disposition:   Home    Planned Readmission: none    Discharge Medications:  See after visit summary for reconciled discharge medications provided to patient and/or family        **Please Note: This note may have been constructed using a voice recognition system**

## 2022-01-17 NOTE — PLAN OF CARE
Problem: PAIN - ADULT  Goal: Verbalizes/displays adequate comfort level or baseline comfort level  Description: Interventions:  - Encourage patient to monitor pain and request assistance  - Assess pain using appropriate pain scale  - Administer analgesics based on type and severity of pain and evaluate response  - Implement non-pharmacological measures as appropriate and evaluate response  - Consider cultural and social influences on pain and pain management  - Notify physician/advanced practitioner if interventions unsuccessful or patient reports new pain  Outcome: Progressing     Problem: INFECTION - ADULT  Goal: Absence or prevention of progression during hospitalization  Description: INTERVENTIONS:  - Assess and monitor for signs and symptoms of infection  - Monitor lab/diagnostic results  - Monitor all insertion sites, i e  indwelling lines, tubes, and drains  - Monitor endotracheal if appropriate and nasal secretions for changes in amount and color  - [place] appropriate cooling/warming therapies per order  - Administer medications as ordered  - Instruct and encourage patient and family to use good hand hygiene technique  - Identify and instruct in appropriate isolation precautions for identified infection/condition  Outcome: Progressing     Problem: Knowledge Deficit  Goal: Patient/family/caregiver demonstrates understanding of disease process, treatment plan, medications, and discharge instructions  Description: Complete learning assessment and assess knowledge base    Interventions:  - Provide teaching at level of understanding  - Provide teaching via preferred learning methods  Outcome: Progressing

## 2022-01-17 NOTE — ASSESSMENT & PLAN NOTE
Hx of aortic regurgitation, last echo in 2019- "Moderate to severe regurgitation" with valve thickening  EF 55%  Echo: EF 06%, normal systolic and diastolic function  Aortic regurgitation is still severe, with possible bicuspid valve       Can follow with outpatient cardiology

## 2022-04-14 ENCOUNTER — HOSPITAL ENCOUNTER (EMERGENCY)
Facility: HOSPITAL | Age: 37
Discharge: HOME | End: 2022-04-14
Attending: EMERGENCY MEDICINE | Admitting: EMERGENCY MEDICINE
Payer: COMMERCIAL

## 2022-04-14 VITALS
RESPIRATION RATE: 16 BRPM | DIASTOLIC BLOOD PRESSURE: 77 MMHG | SYSTOLIC BLOOD PRESSURE: 135 MMHG | HEART RATE: 94 BPM | TEMPERATURE: 97.7 F | OXYGEN SATURATION: 100 %

## 2022-04-14 DIAGNOSIS — H60.502 ACUTE OTITIS EXTERNA OF LEFT EAR, UNSPECIFIED TYPE: Primary | ICD-10-CM

## 2022-04-14 PROCEDURE — 99284 EMERGENCY DEPT VISIT MOD MDM: CPT | Performed by: PHYSICIAN ASSISTANT

## 2022-04-14 PROCEDURE — 99282 EMERGENCY DEPT VISIT SF MDM: CPT

## 2022-04-14 RX ORDER — OFLOXACIN 3 MG/ML
5 SOLUTION AURICULAR (OTIC) 2 TIMES DAILY
Qty: 10 ML | Refills: 0 | Status: SHIPPED | OUTPATIENT
Start: 2022-04-14 | End: 2022-04-21

## 2022-04-14 NOTE — DISCHARGE INSTRUCTIONS
Use drops as directed  Tylenol or Motrin for pain  FOLLOW UP WITH ENT DOCTOR- CALL TOMORROW FOR APPOINTMENT  Nothing into the ears aside from the drops  Finish antibiotic course as prescribed  Return to the ER if anything acutely changes

## 2022-04-14 NOTE — ED PROVIDER NOTES
History  Chief Complaint   Patient presents with    Earache     pt c/o ear pain for the past 2 weeks, pt went to urgent care and got abx but had no relief     Patient is a 53-year-old male presenting to the emergency room for evaluation of left ear pain ongoing for 2 weeks  Patient was seen urgent care on April 4th and was diagnosed with a otitis media with TM perforation, he was started on amoxicillin at this time  Patient then returned to the same Urgent Care on April 9th for worsening ear pain  They diagnosed him with this same as previous visit, otitis media with TM perforation at this time they prescribed him Ciprodex and Augmentin  He states he has been taking them as directed  He states his ear pain is not getting better  He denies any other symptoms including fever, chills, headache, dizziness, nausea, vomiting, diarrhea  History provided by:  Patient   used: No        Prior to Admission Medications   Prescriptions Last Dose Informant Patient Reported? Taking?   dextromethorphan-guaifenesin (MUCINEX DM)  MG per 12 hr tablet   Yes No   Sig: Take 2 tablets by mouth every morning      Facility-Administered Medications: None       Past Medical History:   Diagnosis Date    Aortic valve regurgitation        History reviewed  No pertinent surgical history  History reviewed  No pertinent family history  I have reviewed and agree with the history as documented  E-Cigarette/Vaping     E-Cigarette/Vaping Substances     Social History     Tobacco Use    Smoking status: Current Some Day Smoker     Types: Cigarettes    Smokeless tobacco: Never Used    Tobacco comment: Pt reports smokes one or twice a year   Substance Use Topics    Alcohol use: Yes     Comment: occationally     Drug use: Never       Review of Systems   Constitutional: Negative for chills and fever  HENT: Positive for ear pain  Negative for sore throat  Eyes: Negative for pain and visual disturbance  Respiratory: Negative for cough and shortness of breath  Cardiovascular: Negative for chest pain and palpitations  Gastrointestinal: Negative for abdominal pain and vomiting  Genitourinary: Negative for dysuria and hematuria  Musculoskeletal: Negative for arthralgias and back pain  Skin: Negative for color change and rash  Neurological: Negative for seizures and syncope  All other systems reviewed and are negative  Physical Exam  Physical Exam  Vitals and nursing note reviewed  Constitutional:       Appearance: He is well-developed  HENT:      Head: Normocephalic and atraumatic  Right Ear: Tympanic membrane, ear canal and external ear normal       Ears:      Comments: Left ear canal is extremely edematous and erythematous  Unable to completely visualize the TM  The canal, however is patent  Tenderness with palpation of the tragus  Tenderness with tugging on the auricle  No mastoid tenderness       Mouth/Throat:      Comments: Pharynx is normal  Eyes:      Conjunctiva/sclera: Conjunctivae normal    Cardiovascular:      Rate and Rhythm: Normal rate and regular rhythm  Heart sounds: No murmur heard  Pulmonary:      Effort: Pulmonary effort is normal  No respiratory distress  Breath sounds: Normal breath sounds  Abdominal:      Palpations: Abdomen is soft  Tenderness: There is no abdominal tenderness  Musculoskeletal:      Cervical back: Neck supple  Skin:     General: Skin is warm and dry  Neurological:      Mental Status: He is alert           Vital Signs  ED Triage Vitals [04/14/22 0436]   Temperature Pulse Respirations Blood Pressure SpO2   97 7 °F (36 5 °C) 94 16 135/77 100 %      Temp Source Heart Rate Source Patient Position - Orthostatic VS BP Location FiO2 (%)   Oral Monitor Sitting Right arm --      Pain Score       --           Vitals:    04/14/22 0436   BP: 135/77   Pulse: 94   Patient Position - Orthostatic VS: Sitting       ED Medications  Medications - No data to display    Diagnostic Studies  Results Reviewed     None                 No orders to display            MDM  Number of Diagnoses or Management Options  Acute otitis externa of left ear, unspecified type: established and worsening  Diagnosis management comments: Patient is a 44-year-old male presenting to the emergency room for evaluation of left ear pain for 2 weeks  He has been to the urgent care on 2 separate occasions and has been treated with amoxicillin, Augmentin, Ciprodex  States his symptoms are not getting better  Exam is consistent with otitis externa  His canal is edematous and erythematous  Unable to fully visualize the tympanic membrane  Patient was advised to continue the oral Augmentin  We switched his drops to ofloxacin  We gave him ENT referral   Advised to call this morning to set up an appointment  Patient states his pain has been controlled with Tylenol or Motrin  Patient advised return to the ER if anything acutely changes  Patient Progress  Patient progress: stable      Disposition  Final diagnoses:   Acute otitis externa of left ear, unspecified type     Time reflects when diagnosis was documented in both MDM as applicable and the Disposition within this note     Time User Action Codes Description Comment    4/14/2022  5:13 AM Elizabeth Trivedi Add [H60 [id number]] Acute otitis externa of left ear, unspecified type       ED Disposition     ED Disposition Condition Date/Time Comment    Discharge Stable Thu Apr 14, 2022  5:13 AM Tyler Lisa discharge to home/self care              Follow-up Information     Follow up With Specialties Details Why Contact Info Additional Information    Kush Valentino MD Otolaryngology Schedule an appointment as soon as possible for a visit   [place] [phone]  [facility] Emergency Department Emergency Medicine  If symptoms worsen [address]  [place] [address] [facility] [address] [id number]  [address] Emergency Department, [address], [place], [place], [address]          Discharge Medication List as of 4/14/2022  5:16 AM      START taking these medications    Details   ofloxacin (FLOXIN) 0 3 % otic solution Administer 5 drops into the left ear 2 (two) times a day for 7 days, Starting Thu 4/14/2022, Until Thu 4/21/2022, Normal         CONTINUE these medications which have NOT CHANGED    Details   dextromethorphan-guaifenesin (MUCINEX DM)  MG per 12 hr tablet Take 2 tablets by mouth every morning, Starting Wed 1/12/2022, Historical Med             No discharge procedures on file      PDMP Review     None          ED Provider  Electronically Signed by           Thom Sousa PA-C  04/14/22 0707

## 2022-06-24 ENCOUNTER — HOSPITAL ENCOUNTER (EMERGENCY)
Facility: HOSPITAL | Age: 37
Discharge: HOME | End: 2022-06-24
Attending: EMERGENCY MEDICINE
Payer: COMMERCIAL

## 2022-06-24 VITALS
OXYGEN SATURATION: 97 % | HEIGHT: 71 IN | HEART RATE: 94 BPM | SYSTOLIC BLOOD PRESSURE: 137 MMHG | BODY MASS INDEX: 30.4 KG/M2 | TEMPERATURE: 98.2 F | RESPIRATION RATE: 18 BRPM | DIASTOLIC BLOOD PRESSURE: 87 MMHG

## 2022-06-24 DIAGNOSIS — H60.392 OTHER INFECTIVE ACUTE OTITIS EXTERNA OF LEFT EAR: Primary | ICD-10-CM

## 2022-06-24 PROCEDURE — 99282 EMERGENCY DEPT VISIT SF MDM: CPT

## 2022-06-24 PROCEDURE — 99284 EMERGENCY DEPT VISIT MOD MDM: CPT | Performed by: EMERGENCY MEDICINE

## 2022-06-24 RX ORDER — OFLOXACIN 3 MG/ML
10 SOLUTION AURICULAR (OTIC) DAILY
Qty: 10 ML | Refills: 1 | Status: SHIPPED | OUTPATIENT
Start: 2022-06-24 | End: 2022-07-01

## 2022-06-25 NOTE — ED PROVIDER NOTES
History  Chief Complaint   Patient presents with    Earache     Pt states he has had L ear pain x 3 days  Tylenol at home with little relief  Denies vision/balance issues, no HA  History provided by:  Patient  Earache  Location:  Left  Behind ear:  No abnormality  Quality:  Aching  Severity:  Moderate  Onset quality:  Gradual  Duration:  2 days  Timing:  Constant  Progression:  Unchanged  Chronicity:  New  Context: not foreign body in ear and not recent URI    Relieved by:  None tried  Worsened by:  Nothing  Ineffective treatments:  None tried  Associated symptoms: no fever, no headaches and no rash        Prior to Admission Medications   Prescriptions Last Dose Informant Patient Reported? Taking?   dextromethorphan-guaifenesin (MUCINEX DM)  MG per 12 hr tablet   Yes No   Sig: Take 2 tablets by mouth every morning      Facility-Administered Medications: None       Past Medical History:   Diagnosis Date    Aortic valve regurgitation        History reviewed  No pertinent surgical history  History reviewed  No pertinent family history  I have reviewed and agree with the history as documented  E-Cigarette/Vaping     E-Cigarette/Vaping Substances     Social History     Tobacco Use    Smoking status: Current Some Day Smoker     Types: Cigarettes    Smokeless tobacco: Never Used    Tobacco comment: Pt reports smokes one or twice a year   Substance Use Topics    Alcohol use: Yes     Comment: occationally     Drug use: Never       Review of Systems   Constitutional: Negative for fever  HENT: Positive for ear pain  Respiratory: Negative for chest tightness and shortness of breath  Cardiovascular: Negative for chest pain  Skin: Negative for rash  Neurological: Negative for dizziness, light-headedness and headaches  Physical Exam  Physical Exam  Vitals reviewed  Constitutional:       Appearance: He is well-developed  HENT:      Head: Atraumatic        Right Ear: Tympanic membrane normal       Ears:      Comments: Left ear canal with narrowing, exudate, consistent with acute otitis externa, minimal view of the TM due to narrow canal appeared normal  Eyes:      General: No scleral icterus  Right eye: No discharge  Left eye: No discharge  Conjunctiva/sclera: Conjunctivae normal    Neck:      Trachea: No tracheal deviation  Pulmonary:      Effort: Pulmonary effort is normal  No respiratory distress  Breath sounds: No stridor  Musculoskeletal:         General: No deformity  Cervical back: Neck supple  Skin:     General: Skin is warm and dry  Coloration: Skin is not pale  Findings: No erythema or rash  Neurological:      Mental Status: He is alert  Motor: No abnormal muscle tone  Coordination: Coordination normal          Vital Signs  ED Triage Vitals [06/24/22 1832]   Temperature Pulse Respirations Blood Pressure SpO2   98 2 °F (36 8 °C) 94 18 137/87 97 %      Temp Source Heart Rate Source Patient Position - Orthostatic VS BP Location FiO2 (%)   Oral Monitor Sitting Left arm --      Pain Score       10 - Worst Possible Pain           Vitals:    06/24/22 1832   BP: 137/87   Pulse: 94   Patient Position - Orthostatic VS: Sitting         Visual Acuity      ED Medications  Medications - No data to display    Diagnostic Studies  Results Reviewed     None                 No orders to display              Procedures  Procedures         ED Course                                             MDM  Number of Diagnoses or Management Options  Other infective acute otitis externa of left ear: new and requires workup  Diagnosis management comments:  Will treat with ofloxacin drops, Tylenol ibuprofen for discomfort       Amount and/or Complexity of Data Reviewed  Review and summarize past medical records: yes        Disposition  Final diagnoses:   Other infective acute otitis externa of left ear     Time reflects when diagnosis was documented in both MDM as applicable and the Disposition within this note     Time User Action Codes Description Comment    6/24/2022  7:19 PM Ariela Negron Add [F87 [id number]] Other infective acute otitis externa of left ear       ED Disposition     ED Disposition   Discharge    Condition   Stable    Date/Time   Fri Jun 24, 2022  7:19 PM    Comment   Marily Alfonsoalgo discharge to home/self care  Follow-up Information    None         Discharge Medication List as of 6/24/2022  7:20 PM      START taking these medications    Details   ofloxacin (FLOXIN) 0 3 % otic solution Administer 10 drops into ears daily for 7 days, Starting Fri 6/24/2022, Until Fri 7/1/2022, Normal         CONTINUE these medications which have NOT CHANGED    Details   dextromethorphan-guaifenesin (MUCINEX DM)  MG per 12 hr tablet Take 2 tablets by mouth every morning, Starting Wed 1/12/2022, Historical Med             No discharge procedures on file      PDMP Review     None          ED Provider  Electronically Signed by           Mohinder Chase DO  06/24/22 2019

## 2024-07-29 ENCOUNTER — HOSPITAL ENCOUNTER (EMERGENCY)
Facility: HOSPITAL | Age: 39
Discharge: HOME | End: 2024-07-29
Attending: EMERGENCY MEDICINE
Payer: COMMERCIAL

## 2024-07-29 VITALS
SYSTOLIC BLOOD PRESSURE: 140 MMHG | DIASTOLIC BLOOD PRESSURE: 82 MMHG | RESPIRATION RATE: 18 BRPM | TEMPERATURE: 98.6 F | HEART RATE: 69 BPM | BODY MASS INDEX: 30.23 KG/M2 | OXYGEN SATURATION: 100 % | WEIGHT: 216.71 LBS

## 2024-07-29 DIAGNOSIS — I83.92 VARICOSE VEINS OF LEFT LOWER LEG: ICD-10-CM

## 2024-07-29 DIAGNOSIS — M54.16 LEFT LUMBAR RADICULOPATHY: Primary | ICD-10-CM

## 2024-07-29 DIAGNOSIS — S46.812A STRAIN OF LEFT TRAPEZIUS MUSCLE, INITIAL ENCOUNTER: ICD-10-CM

## 2024-07-29 PROCEDURE — 96372 THER/PROPH/DIAG INJ SC/IM: CPT

## 2024-07-29 PROCEDURE — 99284 EMERGENCY DEPT VISIT MOD MDM: CPT | Performed by: PHYSICIAN ASSISTANT

## 2024-07-29 PROCEDURE — 99284 EMERGENCY DEPT VISIT MOD MDM: CPT

## 2024-07-29 RX ORDER — ACETAMINOPHEN 325 MG/1
975 TABLET ORAL ONCE
Status: COMPLETED | OUTPATIENT
Start: 2024-07-29 | End: 2024-07-29

## 2024-07-29 RX ORDER — METHOCARBAMOL 500 MG/1
500 TABLET, FILM COATED ORAL 4 TIMES DAILY
Qty: 20 TABLET | Refills: 0 | Status: SHIPPED | OUTPATIENT
Start: 2024-07-29 | End: 2024-08-03

## 2024-07-29 RX ORDER — LIDOCAINE 50 MG/G
1 PATCH TOPICAL ONCE
Status: DISCONTINUED | OUTPATIENT
Start: 2024-07-29 | End: 2024-07-29 | Stop reason: HOSPADM

## 2024-07-29 RX ORDER — KETOROLAC TROMETHAMINE 30 MG/ML
15 INJECTION, SOLUTION INTRAMUSCULAR; INTRAVENOUS ONCE
Status: COMPLETED | OUTPATIENT
Start: 2024-07-29 | End: 2024-07-29

## 2024-07-29 RX ADMIN — LIDOCAINE 5% 1 PATCH: 700 PATCH TOPICAL at 19:52

## 2024-07-29 RX ADMIN — KETOROLAC TROMETHAMINE 15 MG: 30 INJECTION, SOLUTION INTRAMUSCULAR; INTRAVENOUS at 19:53

## 2024-07-29 RX ADMIN — ACETAMINOPHEN 975 MG: 325 TABLET, FILM COATED ORAL at 19:51

## 2024-07-29 NOTE — ED PROVIDER NOTES
"History  Chief Complaint   Patient presents with    Back Pain     Back and chest from below neck to left leg after picking up a water case. Pt is concerned it could be something with his heart because the chest pain started a little after picking up the water case     Patient is a 38-year-old male with a history of aortic valve regurgitation that presents to the emerged from with dull aching persistent left back pain with radiation to left lower extremity for 2 days.  Patient has associated symptomatology of aching nonradiating left upper back pain symptoms beginning with the current ED presentation of left lower back pain.  Patient states that he had lifted a case of water yesterday and \"after couple hours I have noted a pain on my left upper and lower back that got worse.\"  Patient denies saddle anesthesias and bowel bladder retention.  Patient denies any rash or skin changes.  Patient states that he is currently self amatory without assistance of cane, persons or assistive amatory devices.  Patient denies palliative factors or provocative factors of pressure to left upper and lower back regions.  Patient has not effective treatment.  Patient denies fevers, chills, nausea, vomiting, diarrhea, constipation and urinary symptoms.  Patient denies recent fall or recent trauma.  Patient reports concern over over current symptoms given his recent echocardiogram noting \"moderate to severe aortic regurgitation patient denies sick contacts and recent travel.  Patient denies chest pain, shortness of breath, abdominal pain.      History provided by:  Patient   used: No    Back Pain  Associated symptoms: no abdominal pain, no chest pain, no dysuria, no fever, no headaches, no numbness and no weakness        Prior to Admission Medications   Prescriptions Last Dose Informant Patient Reported? Taking?   dextromethorphan-guaifenesin (MUCINEX DM)  MG per 12 hr tablet   Yes No   Sig: Take 2 tablets by mouth " every morning      Facility-Administered Medications: None       Past Medical History:   Diagnosis Date    Aortic valve regurgitation        History reviewed. No pertinent surgical history.    History reviewed. No pertinent family history.  I have reviewed and agree with the history as documented.    E-Cigarette/Vaping     E-Cigarette/Vaping Substances     Social History     Tobacco Use    Smoking status: Some Days     Types: Cigarettes    Smokeless tobacco: Never    Tobacco comments:     Pt reports smokes one or twice a year   Substance Use Topics    Alcohol use: Yes     Comment: occationally     Drug use: Never       Review of Systems   Constitutional:  Negative for activity change, appetite change, chills and fever.   HENT:  Negative for congestion, ear pain, postnasal drip, rhinorrhea, sinus pressure, sinus pain, sore throat and tinnitus.    Eyes:  Negative for photophobia, pain and visual disturbance.   Respiratory:  Negative for cough, chest tightness and shortness of breath.    Cardiovascular:  Negative for chest pain and palpitations.   Gastrointestinal:  Negative for abdominal pain, constipation, diarrhea, nausea and vomiting.   Genitourinary:  Negative for difficulty urinating, dysuria, flank pain, frequency, hematuria and urgency.   Musculoskeletal:  Positive for back pain. Negative for arthralgias, gait problem, neck pain and neck stiffness.   Skin:  Negative for color change, pallor and rash.   Allergic/Immunologic: Negative for environmental allergies and food allergies.   Neurological:  Negative for dizziness, seizures, syncope, weakness, numbness and headaches.   Psychiatric/Behavioral:  Negative for confusion.    All other systems reviewed and are negative.      Physical Exam  Physical Exam  Vitals and nursing note reviewed.   Constitutional:       General: He is awake.      Appearance: Normal appearance. He is well-developed and normal weight. He is not ill-appearing, toxic-appearing or  diaphoretic.   HENT:      Head: Normocephalic and atraumatic.      Jaw: There is normal jaw occlusion.      Right Ear: Hearing, tympanic membrane and external ear normal. No decreased hearing noted. No drainage, swelling or tenderness. No mastoid tenderness.      Left Ear: Hearing, tympanic membrane and external ear normal. No decreased hearing noted. No drainage, swelling or tenderness. No mastoid tenderness.      Nose: Nose normal.      Mouth/Throat:      Lips: Pink.      Mouth: Mucous membranes are moist.      Pharynx: Oropharynx is clear. Uvula midline.   Eyes:      General: Lids are normal. Vision grossly intact. Gaze aligned appropriately.         Right eye: No discharge.         Left eye: No discharge.      Extraocular Movements: Extraocular movements intact.      Conjunctiva/sclera: Conjunctivae normal.      Pupils: Pupils are equal, round, and reactive to light.   Neck:      Vascular: No JVD.      Trachea: Trachea and phonation normal. No tracheal tenderness or tracheal deviation.      Comments: No midline tenderness, no stepoffs  No tenderness with passive and active cervical ROM with head turning approximately 45 degree angles to the left and right  No cervical tenderness with cranial axial loading    Cardiovascular:      Rate and Rhythm: Normal rate and regular rhythm.      Pulses: Normal pulses.           Radial pulses are 2+ on the right side and 2+ on the left side.        Posterior tibial pulses are 2+ on the right side and 2+ on the left side.      Heart sounds: Normal heart sounds.   Pulmonary:      Effort: Pulmonary effort is normal.      Breath sounds: Normal breath sounds and air entry. No stridor. No decreased breath sounds, wheezing, rhonchi or rales.   Chest:      Chest wall: No tenderness.   Abdominal:      General: Abdomen is flat. Bowel sounds are normal. There is no distension.      Palpations: Abdomen is soft. Abdomen is not rigid.      Tenderness: There is no abdominal tenderness. There  is no guarding or rebound.   Musculoskeletal:         General: Normal range of motion.      Cervical back: Full passive range of motion without pain, normal range of motion and neck supple. No rigidity. No spinous process tenderness or muscular tenderness. Normal range of motion.      Comments: Passive ROM intact  Upper and lower extremity 5/5 bilaterally  Neurovascularly intact  No grinding or clicking of joints    Left lumbar radiculopathy reproduced with palpation to left lumbar region superior to left buttock    Left trapezius/latissimus dorsi pain reproduced with active range of motion retraction of scapula a on left upper extremity, and shrugging movement against resistance left side, upper extremity   Feet:      Right foot:      Toenail Condition: Right toenails are normal.      Left foot:      Toenail Condition: Left toenails are normal.   Lymphadenopathy:      Head:      Right side of head: No submental, submandibular, tonsillar, preauricular, posterior auricular or occipital adenopathy.      Left side of head: No submental, submandibular, tonsillar, preauricular, posterior auricular or occipital adenopathy.      Cervical: No cervical adenopathy.      Right cervical: No superficial, deep or posterior cervical adenopathy.     Left cervical: No superficial, deep or posterior cervical adenopathy.   Skin:     General: Skin is warm.      Capillary Refill: Capillary refill takes less than 2 seconds.      Findings: No rash.   Neurological:      General: No focal deficit present.      Mental Status: He is alert and oriented to person, place, and time. Mental status is at baseline.      GCS: GCS eye subscore is 4. GCS verbal subscore is 5. GCS motor subscore is 6.      Sensory: No sensory deficit.      Deep Tendon Reflexes: Reflexes are normal and symmetric.      Reflex Scores:       Patellar reflexes are 2+ on the right side and 2+ on the left side.  Psychiatric:         Attention and Perception: Attention normal.          Mood and Affect: Mood normal.         Speech: Speech normal.         Behavior: Behavior normal. Behavior is cooperative.         Thought Content: Thought content normal.         Judgment: Judgment normal.         Vital Signs  ED Triage Vitals   Temperature Pulse Respirations Blood Pressure SpO2   07/29/24 1840 07/29/24 1840 07/29/24 1840 07/29/24 1840 07/29/24 1840   98.6 °F (37 °C) 69 18 140/82 100 %      Temp Source Heart Rate Source Patient Position - Orthostatic VS BP Location FiO2 (%)   07/29/24 1840 -- 07/29/24 1840 07/29/24 1840 --   Oral  Sitting Right arm       Pain Score       07/29/24 1951       9           Vitals:    07/29/24 1840   BP: 140/82   Pulse: 69   Patient Position - Orthostatic VS: Sitting         Visual Acuity      ED Medications  Medications   lidocaine (LIDODERM) 5 % patch 1 patch (1 patch Topical Medication Applied 7/29/24 1952)   ketorolac (TORADOL) injection 15 mg (15 mg Intramuscular Given 7/29/24 1953)   acetaminophen (TYLENOL) tablet 975 mg (975 mg Oral Given 7/29/24 1951)       Diagnostic Studies  Results Reviewed       None                   No orders to display              Procedures  Procedures         ED Course                                               Medical Decision Making  Patient is a 38-year-old male with a history of aortic valve regurgitation that presents to the emerged from with dull aching persistent left back pain with radiation to left lower extremity for 2 days.   Patient denies saddle anesthesias, bowel and bladder retention-doubt cauda equina syndrome; no Meningeal signs  No focal neurological signs; strength bilateral lower extremity 5/5  Patient reported no chest pain during or after physical movement of case of water that patient had reported happened yesterday.  Patient denies any chest pain shortness of breath or abdominal pain symptomatology at this time.  Patient also was concerned with Antonio veins left lower extremity, medial aspect of left  "thigh, no surrounding erythema, fluctuance, induration, no tenderness.  Constipation on utilizing compression stocking that he can buy over-the-counter and use while active or standing for long periods of time and remove when patient goes to a horizontal/laying down position.  Delivered Toradol, Tylenol, and Lidoderm patch emergency department; patient verbalizes decrease in back pain status post medication delivery  Prescribed Robaxin counseled patient on medication administration and side effects  No midline spinal tenderness; cervical, thoracic, lumbar, sacral, coccyx  Follow[facility]up with [facility]  Follow-up with PCP  Follow up with emergency department symptoms persist or exacerbate  Patient verbalized understanding of all clinical imaging findings, discharge instructions, follow-up, and verbalized agreement with current treatment plan.      Amount and/or Complexity of Data Reviewed  External Data Reviewed: radiology.     Details: 1/16/2022-    \"Study Details    This transthoracic echocardiogram was performed at bedside. This was a routine, inpatient study. Study quality was adequate. This was a technically difficult study due to decreased penetration, lung interference and restricted mobility. A limited 2D, color flow Doppler and spectral Doppler transthoracic echocardiogram was performed.  The apical and parasternal views were obtained. Patient exhibited sinus rhythm.    History    Aortic Insufficiency    Interpretation Summary  Show Result Comparison   ·  Left Ventricle: Left ventricular cavity size is normal. The left ventricular ejection fraction is 65%. Systolic function is normal. Wall motion is normal. Diastolic function is normal. Wall thickness is normal. There is no concentric hypertrophy.  ·  Aortic Valve: The aortic valve is possibly bicuspid. The leaflets are not thickened. The leaflets are moderately calcified. The leaflets exhibit normal mobility. There is moderate to severe " "regurgitation.  ·  Aorta: The aortic root is normal in size.     Findings    Left Ventricle Left ventricular cavity size is normal. Wall thickness is normal. The left ventricular ejection fraction is 65%. Systolic function is normal.  Wall motion is normal. There is no concentric hypertrophy. Diastolic function is normal.  Right Ventricle Right ventricular cavity size is normal. Systolic function is normal. Wall thickness is normal.  Left Atrium The atrium is normal in size.  Right Atrium The atrium is normal in size.  Aortic Valve The aortic valve is possibly bicuspid. The leaflets are not thickened. The leaflets are moderately calcified. The leaflets exhibit normal mobility. There is moderate to severe regurgitation. There is no evidence of stenosis.  Mitral Valve The mitral valve has normal structure and function. There is trace regurgitation. There is no evidence of stenosis.  Tricuspid Valve Tricuspid valve structure is normal. There is no evidence of regurgitation. There is no evidence of stenosis.  Pulmonic Valve Pulmonic valve structure is normal. There is no evidence of regurgitation. There is no evidence of stenosis.  Ascending Aorta The aortic root is normal in size.  IVC/SVC The inferior vena cava is normal in size.  Pericardium There is no pericardial effusion. The pericardium is normal in appearance.\"    Risk  OTC drugs.  Prescription drug management.                 Disposition  Final diagnoses:   Left lumbar radiculopathy   Strain of left trapezius muscle, initial encounter   Varicose veins of left lower leg     Time reflects when diagnosis was documented in both MDM as applicable and the Disposition within this note       Time User Action Codes Description Comment    7/29/2024  7:45 PM Amol Hernandez [M54.16] Left lumbar radiculopathy     7/29/2024  7:45 PM Amol Hernandez [S46.812A] Strain of left trapezius muscle, initial encounter     7/29/2024  7:47 PM Amol Hernandez [I83.92] Varicose " veins of left lower leg           ED Disposition       ED Disposition   Discharge    Condition   Stable    Date/Time   Mon Jul 29, 2024  7:51 PM    Comment   Tyler Socrates discharge to home/self care.                   Follow-up Information       Follow up With Specialties Details Why Contact Info Additional Information    [facility]   [phone]    [address] [id number] [phone] [address], [id number] [phone]    [facility] Emergency Department Emergency Medicine   [facility] [address]  [phone] [facility] Emergency Department, [facility] [place], [place], [address]            Discharge Medication List as of 7/29/2024  7:55 PM        START taking these medications    Details   methocarbamol (ROBAXIN) 500 mg tablet Take 1 tablet (500 mg total) by mouth 4 (four) times a day for 5 days, Starting Mon 7/29/2024, Until Sat 8/3/2024, Normal           CONTINUE these medications which have NOT CHANGED    Details   dextromethorphan-guaifenesin (MUCINEX DM)  MG per 12 hr tablet Take 2 tablets by mouth every morning, Starting Wed 1/12/2022, Historical Med                 PDMP Review         Value Time User    PDMP Reviewed  Yes 7/29/2024  8:01 PM Amol Hernandez PA-C            ED Provider  Electronically Signed by             Amol Hernandez PA-C  07/29/24 2003

## 2024-07-29 NOTE — Clinical Note
Tyler Crowell was seen and treated in our emergency department on 7/29/2024.                Diagnosis:     Tyler  .    He may return on this date: 07/31/2024         If you have any questions or concerns, please don't hesitate to call.      Amol Hernandez PA-C    ______________________________           _______________          _______________  Hospital Representative                              Date                                Time

## 2024-07-30 ENCOUNTER — TELEPHONE (OUTPATIENT)
Dept: PHYSICAL THERAPY | Facility: OTHER | Age: 39
End: 2024-07-30

## 2024-07-30 NOTE — TELEPHONE ENCOUNTER
Call placed to patient per [facility] referral.    Call went straight to voicemail.    V/M left for patient to call back. Phone number and hours of business provided.    This is the 1st attempt to reach the patient. Will defer referral per protocol.

## 2025-01-26 ENCOUNTER — APPOINTMENT (EMERGENCY)
Dept: CT IMAGING | Facility: HOSPITAL | Age: 40
End: 2025-01-26
Payer: COMMERCIAL

## 2025-01-26 ENCOUNTER — HOSPITAL ENCOUNTER (EMERGENCY)
Facility: HOSPITAL | Age: 40
Discharge: HOME | End: 2025-01-26
Attending: EMERGENCY MEDICINE
Payer: COMMERCIAL

## 2025-01-26 VITALS
DIASTOLIC BLOOD PRESSURE: 68 MMHG | TEMPERATURE: 98.3 F | RESPIRATION RATE: 17 BRPM | HEART RATE: 73 BPM | OXYGEN SATURATION: 98 % | SYSTOLIC BLOOD PRESSURE: 120 MMHG

## 2025-01-26 DIAGNOSIS — Z87.74 HISTORY OF BICUSPID AORTIC VALVE: ICD-10-CM

## 2025-01-26 DIAGNOSIS — R51.9 HEADACHE: Primary | ICD-10-CM

## 2025-01-26 LAB
ALBUMIN SERPL BCG-MCNC: 4.5 G/DL (ref 3.5–5)
ALP SERPL-CCNC: 51 U/L (ref 34–104)
ALT SERPL W P-5'-P-CCNC: 54 U/L (ref 7–52)
ANION GAP SERPL CALCULATED.3IONS-SCNC: 10 MMOL/L (ref 4–13)
AST SERPL W P-5'-P-CCNC: 26 U/L (ref 13–39)
ATRIAL RATE: 77 BPM
BASOPHILS # BLD AUTO: 0.06 THOUSANDS/ΜL (ref 0–0.1)
BASOPHILS NFR BLD AUTO: 1 % (ref 0–1)
BILIRUB SERPL-MCNC: 0.47 MG/DL (ref 0.2–1)
BUN SERPL-MCNC: 13 MG/DL (ref 5–25)
CALCIUM SERPL-MCNC: 9.7 MG/DL (ref 8.4–10.2)
CHLORIDE SERPL-SCNC: 103 MMOL/L (ref 96–108)
CO2 SERPL-SCNC: 25 MMOL/L (ref 21–32)
CREAT SERPL-MCNC: 1.05 MG/DL (ref 0.6–1.3)
CRP SERPL QL: 2.3 MG/L
EOSINOPHIL # BLD AUTO: 0.14 THOUSAND/ΜL (ref 0–0.61)
EOSINOPHIL NFR BLD AUTO: 2 % (ref 0–6)
ERYTHROCYTE [DISTWIDTH] IN BLOOD BY AUTOMATED COUNT: 13.9 % (ref 11.6–15.1)
ERYTHROCYTE [SEDIMENTATION RATE] IN BLOOD: 14 MM/HOUR (ref 0–14)
FLUAV AG UPPER RESP QL IA.RAPID: NEGATIVE
FLUBV AG UPPER RESP QL IA.RAPID: NEGATIVE
GFR SERPL CREATININE-BSD FRML MDRD: 90 ML/MIN/1.73SQ M
GLUCOSE SERPL-MCNC: 96 MG/DL (ref 65–140)
HCT VFR BLD AUTO: 46.4 % (ref 36.5–49.3)
HGB BLD-MCNC: 15.2 G/DL (ref 12–17)
IMM GRANULOCYTES # BLD AUTO: 0.02 THOUSAND/UL (ref 0–0.2)
IMM GRANULOCYTES NFR BLD AUTO: 0 % (ref 0–2)
LYMPHOCYTES # BLD AUTO: 2.77 THOUSANDS/ΜL (ref 0.6–4.47)
LYMPHOCYTES NFR BLD AUTO: 38 % (ref 14–44)
MCH RBC QN AUTO: 26.4 PG (ref 26.8–34.3)
MCHC RBC AUTO-ENTMCNC: 32.8 G/DL (ref 31.4–37.4)
MCV RBC AUTO: 81 FL (ref 82–98)
MONOCYTES # BLD AUTO: 0.54 THOUSAND/ΜL (ref 0.17–1.22)
MONOCYTES NFR BLD AUTO: 7 % (ref 4–12)
NEUTROPHILS # BLD AUTO: 3.84 THOUSANDS/ΜL (ref 1.85–7.62)
NEUTS SEG NFR BLD AUTO: 52 % (ref 43–75)
NRBC BLD AUTO-RTO: 0 /100 WBCS
P AXIS: 23 DEGREES
PLATELET # BLD AUTO: 284 THOUSANDS/UL (ref 149–390)
PMV BLD AUTO: 10.1 FL (ref 8.9–12.7)
POTASSIUM SERPL-SCNC: 4 MMOL/L (ref 3.5–5.3)
PR INTERVAL: 162 MS
PROT SERPL-MCNC: 7.4 G/DL (ref 6.4–8.4)
QRS AXIS: -37 DEGREES
QRSD INTERVAL: 100 MS
QT INTERVAL: 376 MS
QTC INTERVAL: 425 MS
RBC # BLD AUTO: 5.76 MILLION/UL (ref 3.88–5.62)
SARS-COV+SARS-COV-2 AG RESP QL IA.RAPID: NEGATIVE
SODIUM SERPL-SCNC: 138 MMOL/L (ref 135–147)
T WAVE AXIS: -21 DEGREES
VENTRICULAR RATE: 77 BPM
WBC # BLD AUTO: 7.37 THOUSAND/UL (ref 4.31–10.16)

## 2025-01-26 PROCEDURE — 86140 C-REACTIVE PROTEIN: CPT | Performed by: STUDENT IN AN ORGANIZED HEALTH CARE EDUCATION/TRAINING PROGRAM

## 2025-01-26 PROCEDURE — 93005 ELECTROCARDIOGRAM TRACING: CPT

## 2025-01-26 PROCEDURE — 87811 SARS-COV-2 COVID19 W/OPTIC: CPT

## 2025-01-26 PROCEDURE — 96365 THER/PROPH/DIAG IV INF INIT: CPT

## 2025-01-26 PROCEDURE — 96375 TX/PRO/DX INJ NEW DRUG ADDON: CPT

## 2025-01-26 PROCEDURE — 85652 RBC SED RATE AUTOMATED: CPT | Performed by: STUDENT IN AN ORGANIZED HEALTH CARE EDUCATION/TRAINING PROGRAM

## 2025-01-26 PROCEDURE — 99285 EMERGENCY DEPT VISIT HI MDM: CPT | Performed by: EMERGENCY MEDICINE

## 2025-01-26 PROCEDURE — 80053 COMPREHEN METABOLIC PANEL: CPT | Performed by: STUDENT IN AN ORGANIZED HEALTH CARE EDUCATION/TRAINING PROGRAM

## 2025-01-26 PROCEDURE — 96366 THER/PROPH/DIAG IV INF ADDON: CPT

## 2025-01-26 PROCEDURE — 85025 COMPLETE CBC W/AUTO DIFF WBC: CPT | Performed by: STUDENT IN AN ORGANIZED HEALTH CARE EDUCATION/TRAINING PROGRAM

## 2025-01-26 PROCEDURE — 87804 INFLUENZA ASSAY W/OPTIC: CPT

## 2025-01-26 PROCEDURE — 93010 ELECTROCARDIOGRAM REPORT: CPT | Performed by: STUDENT IN AN ORGANIZED HEALTH CARE EDUCATION/TRAINING PROGRAM

## 2025-01-26 PROCEDURE — 70450 CT HEAD/BRAIN W/O DYE: CPT

## 2025-01-26 PROCEDURE — 36415 COLL VENOUS BLD VENIPUNCTURE: CPT | Performed by: STUDENT IN AN ORGANIZED HEALTH CARE EDUCATION/TRAINING PROGRAM

## 2025-01-26 PROCEDURE — 99284 EMERGENCY DEPT VISIT MOD MDM: CPT

## 2025-01-26 RX ORDER — METOCLOPRAMIDE HYDROCHLORIDE 5 MG/ML
10 INJECTION INTRAMUSCULAR; INTRAVENOUS ONCE
Status: COMPLETED | OUTPATIENT
Start: 2025-01-26 | End: 2025-01-26

## 2025-01-26 RX ORDER — MAGNESIUM SULFATE HEPTAHYDRATE 40 MG/ML
2 INJECTION, SOLUTION INTRAVENOUS ONCE
Status: COMPLETED | OUTPATIENT
Start: 2025-01-26 | End: 2025-01-26

## 2025-01-26 RX ORDER — KETOROLAC TROMETHAMINE 30 MG/ML
15 INJECTION, SOLUTION INTRAMUSCULAR; INTRAVENOUS ONCE
Status: COMPLETED | OUTPATIENT
Start: 2025-01-26 | End: 2025-01-26

## 2025-01-26 RX ORDER — DIPHENHYDRAMINE HYDROCHLORIDE 50 MG/ML
25 INJECTION INTRAMUSCULAR; INTRAVENOUS ONCE
Status: COMPLETED | OUTPATIENT
Start: 2025-01-26 | End: 2025-01-26

## 2025-01-26 RX ADMIN — METOCLOPRAMIDE 10 MG: 5 INJECTION, SOLUTION INTRAMUSCULAR; INTRAVENOUS at 16:08

## 2025-01-26 RX ADMIN — SODIUM CHLORIDE 1000 ML: 0.9 INJECTION, SOLUTION INTRAVENOUS at 16:07

## 2025-01-26 RX ADMIN — MAGNESIUM SULFATE HEPTAHYDRATE 2 G: 40 INJECTION, SOLUTION INTRAVENOUS at 16:08

## 2025-01-26 RX ADMIN — DIPHENHYDRAMINE HYDROCHLORIDE 25 MG: 50 INJECTION, SOLUTION INTRAMUSCULAR; INTRAVENOUS at 16:08

## 2025-01-26 RX ADMIN — KETOROLAC TROMETHAMINE 15 MG: 30 INJECTION, SOLUTION INTRAMUSCULAR; INTRAVENOUS at 16:07

## 2025-01-26 NOTE — DISCHARGE INSTRUCTIONS
Neurology referral provided for additional evaluation of your headache and left upper extremity radicular symptoms  Cardiology referral provided for known bicuspid aortic valve/regurgitation

## 2025-01-26 NOTE — ED ATTENDING ATTESTATION
1/26/2025  I, Kori Mathew MD, saw and evaluated the patient. I have discussed the patient with the resident/non-physician practitioner and agree with the resident's/non-physician practitioner's findings, Plan of Care, and MDM as documented in the resident's/non-physician practitioner's note, except where noted. All available labs and Radiology studies were reviewed.  I was present for key portions of any procedure(s) performed by the resident/non-physician practitioner and I was immediately available to provide assistance.       At this point I agree with the current assessment done in the Emergency Department.  I have conducted an independent evaluation of this patient a history and physical is as follows:    This is a 39-year-old male patient presenting to the ED today for complaint of a headache.  Patient states that he has had headache for the past 3 weeks, off and on, and progressively worsening.  Patient denies any fever chills or sweats, nausea vomiting abdominal pain, chest pain or shortness of breath.  He has started to have generalized achiness over the past 1 to 2 days.  He also has had some muscle soreness in his upper traps bilaterally, with some left-sided trap numbness/tingling associated.  This is no longer present currently.  His exam for the most part is unremarkable aside from some temporal tenderness to palpation.  His exam does not yield any significant abnormalities.  He does not have any scalp tenderness.  He does not have any jaw claudication.  His differential diagnosis includes: Tension headache versus migraine headache versus less likely subarachnoid hemorrhage versus temporal arteritis versus other.  I do think that patient is less likely to have temporal arteritis or subarachnoid hemorrhage, considering the timeline of his symptoms, the fact that his symptoms are clearly not debilitating.  He does not have polymyalgia rheumatica, and in general he is not in a drainage.   "Nonetheless he underwent a CBC, metabolic panel, sed rate, CRP all of which were unremarkable.  Patient had a metabolic panel also Kwai.  He underwent a CT of his head showing no significant acute abnormalities.  Patient was then referred to neurology for his chronic headaches.  He was given a migraine cocktail with complete resolution of his symptoms.  Patient also referred to cardiology to follow-up with his aortic regurgitation.  The management plan was discussed in detail with the patient at bedside and all questions were answered. Strict ED return instructions were discussed at bedside. Prior to discharge, both verbal and written instructions were provided. We discussed the signs and symptoms that should prompt the patient to return to the ED. All questions were answered and the patient was comfortable with the plan of care and discharged home. The patient agrees to return to the Emergency Department for concerns and/or progression of illness.    Portions of the above record have been created with voice recognition software.  Occasional wrong word or \"sound alike\" substitutions may have occurred due to the inherent limitations of voice recognition software.  Read the chart carefully and recognize, using context, where substitutions may have occurred.    ED Course         Critical Care Time  Procedures      "

## 2025-01-26 NOTE — ED PROVIDER NOTES
Time reflects when diagnosis was documented in both MDM as applicable and the Disposition within this note       Time User Action Codes Description Comment    1/26/2025  5:11 PM Pineda Jaramillo Add [R51.9] Headache     1/26/2025  5:12 PM Pineda Jaramillo Add [Z87.74] History of bicuspid aortic valve           ED Disposition       ED Disposition   Discharge    Condition   Stable    Date/Time   Sun Jan 26, 2025  6:23 PM    Comment   Tyler Socrates discharge to home/self care.                   Assessment & Plan       Medical Decision Making  Patient presents with:  Headache: Patient states he started with HA, generalized achiness for about an hour now. Also c/o tightness in chest. Denies fevers.   DDx includes primary headache, giant cell arteritis, CVA, MSK strain, cervical radiculopathy, low suspicion for meningitis, or other infectious process at this time  Workup per ED course: Labs reassuring, ESR CRP within normal limits.  Low suspicion for giant cell arteritis at this time.    Patient notes marked symptomatic improvement with migraine cocktail, exam remained nonfocal.  CT head without ICH, sinusitis present.   Discharged with PCP follow-up, referral cardiology/neurology   Dispo: Workup and return precautions reviewed. Patient expresses understanding, is comfortable with discharge, aggress to follow-up as advised and return to ED if symptoms recur.       Amount and/or Complexity of Data Reviewed  Labs: ordered. Decision-making details documented in ED Course.  Radiology: ordered. Decision-making details documented in ED Course.  ECG/medicine tests:  Decision-making details documented in ED Course.    Risk  Prescription drug management.        ED Course as of 01/27/25 2303   Sun Jan 26, 2025   1530 3 weeks intermittent headache, neck and upper back stiffness, with tingling left upper extremity with neck rotation/lateral bending.  Additionally notes stabbing pain/tingling of right temporal region,  which worsens with neck extension, and left ankle soreness/swelling.  Denies trauma or acute inciting incident. Cervical range of motion full. Denies visual changes, numbness/weakness of extremities.  H/o bicuspid aortic valve with aortic regurgitation. Symptoms have not limited his ability to work as a long-.     Resting comfortably without acute distress  No obvious focal deficit  Left radicular symptoms with cervical lateral bending/rotation  Distal pulses intact  Cardiopulmonary/GI exam reassuring  Distal pulses intact, without edema    Concern for primary headache, giant cell arteritis, CVA, MSK strain, cervical radiculopathy, low suspicion for meningitis, or other infectious process at this time    Plan lab work, laboratory markers, migraine cocktail, CT head Noncon   1546 Echo 1/16/2022 (per chart review)   Left Ventricle: Left ventricular cavity size is normal. The left ventricular ejection fraction is 65%. Systolic function is normal. Wall motion is normal. Diastolic function is normal. Wall thickness is normal. There is no concentric hypertrophy.  ·  Aortic Valve: The aortic valve is possibly bicuspid. The leaflets are not thickened. The leaflets are moderately calcified. The leaflets exhibit normal mobility. There is moderate to severe regurgitation.  ·  Aorta: The aortic root is normal in size.     1703 WBC: 7.37   1703 Hemoglobin: 15.2   1703 Comprehensive metabolic panel(!)  No electrolyte derangement, anion gap, Transaminitis, CARIDAD     1703 C-REACTIVE PROTEIN: 2.3   1703 Sed Rate: 14  Low suspicion for temporal arteritis    1703 FLU/COVID Rapid Antigen (30 min. TAT) - Preferred screening test in ED   1704 ECG 12 lead  ECG per my independent interpretation:   Normal rate, regular rhythm, leftward axis,   AL/QRS/Qtc within normal limits  no ST elevations or depressions   Compared to prior ECG, no significant interval change   1755 Patient resting comfortably without new  complaints.    Disposition pending final CT results   2150 CT head without contrast  IMPRESSION:     No acute intracranial abnormality.  Evidence of sinusitis       Medications   ketorolac (TORADOL) injection 15 mg (15 mg Intravenous Given 1/26/25 1607)   metoclopramide (REGLAN) injection 10 mg (10 mg Intravenous Given 1/26/25 1608)   magnesium sulfate 2 g/50 mL IVPB (premix) 2 g (0 g Intravenous Stopped 1/26/25 1741)   sodium chloride 0.9 % bolus 1,000 mL (0 mL Intravenous Stopped 1/26/25 1741)   diphenhydrAMINE (BENADRYL) injection 25 mg (25 mg Intravenous Given 1/26/25 1608)       ED Risk Strat Scores                                              History of Present Illness       Chief Complaint   Patient presents with    Headache     Patient states he started with HA, generalized achiness for about an hour now. Also c/o tightness in chest. Denies fevers.        Past Medical History:   Diagnosis Date    Aortic valve regurgitation       History reviewed. No pertinent surgical history.   History reviewed. No pertinent family history.   Social History     Tobacco Use    Smoking status: Some Days     Types: Cigarettes    Smokeless tobacco: Never    Tobacco comments:     Pt reports smokes one or twice a year   Substance Use Topics    Alcohol use: Yes     Comment: occationally     Drug use: Never      E-Cigarette/Vaping      E-Cigarette/Vaping Substances      I have reviewed and agree with the history as documented.     Tyler Crowell is a 39 y.o. male p/w 3 weeks intermittent headache, neck and upper back stiffness, with tingling left upper extremity with neck rotation/lateral bending.  Additionally notes stabbing pain/tingling of right temporal region, which worsens with neck extension, and left ankle soreness/swelling.  Denies trauma or acute inciting incident. Cervical range of motion full. Denies visual changes, numbness/weakness of extremities.  H/o bicuspid aortic valve with aortic regurgitation. Symptoms have  not limited his ability to work as a long-.     All other systems reviewed and are negative    HPI    Review of Systems        Objective       ED Triage Vitals [01/26/25 1132]   Temperature Pulse Blood Pressure Respirations SpO2 Patient Position - Orthostatic VS   98.3 °F (36.8 °C) 78 133/73 18 98 % Sitting      Temp Source Heart Rate Source BP Location FiO2 (%) Pain Score    Oral Monitor Right arm -- --      Vitals      Date and Time Temp Pulse SpO2 Resp BP Pain Score FACES Pain Rating User   01/26/25 1539 -- 73 98 % 17 120/68 -- -- KB   01/26/25 1132 98.3 °F (36.8 °C) 78 98 % 18 133/73 -- -- AL            Physical Exam    General appearance: resting comfortably, no acute distress   HENT: Normocephalic, atraumatic, hearing grossly intact, and mucous membranes moist   -Right temporal scalp nontender, without erythema/ecchymosis or overlying skin changes  -Jaw claudication absent  Eyes: Conjunctiva normal,  Lungs/Chest: Respirations even and unlabored, breath sounds normal  Cardiovascular: Normal rate, regular rhythm  Abdomen: soft, non-distended, non-tender  Musculoskeletal: extremities normal, atraumatic, no cyanosis or edema   -Left upper extremity neck pain with radicular symptoms with cervical lateral bending/rotation. ROM full  -Cervical paraspinal/upper trapezius muscular tenderness  Pulses: radial / dorsalis pedis pulses palpable  Skin: warm and dry   Neurologic: Awake and alert, no apparent focal deficit  Face symmetric, PERRL. EOM Intact  Speaks spontaneously, actively following commands.   Full strength, moving all extremities equally. Sensation intact to light touch B/L UE and LE.  Psych: Mood consistent with affect       Results Reviewed       Procedure Component Value Units Date/Time    Sedimentation rate, automated [[id number]]  (Normal) Collected: 01/26/25 1551    Lab Status: Final result Specimen: Blood from Arm, Right Updated: 01/26/25 1633     Sed Rate 14 mm/hour     Comprehensive  metabolic panel [[id number]]  (Abnormal) Collected: 01/26/25 1551    Lab Status: Final result Specimen: Blood from Arm, Right Updated: 01/26/25 1624     Sodium 138 mmol/L      Potassium 4.0 mmol/L      Chloride 103 mmol/L      CO2 25 mmol/L      ANION GAP 10 mmol/L      BUN 13 mg/dL      Creatinine 1.05 mg/dL      Glucose 96 mg/dL      Calcium 9.7 mg/dL      AST 26 U/L      ALT 54 U/L      Alkaline Phosphatase 51 U/L      Total Protein 7.4 g/dL      Albumin 4.5 g/dL      Total Bilirubin 0.47 mg/dL      eGFR 90 ml/min/1.73sq m     Narrative:      [employer] guidelines for Chronic Kidney Disease (CKD):     Stage 1 with normal or high GFR (GFR > 90 mL/min/1.73 square meters)    Stage 2 Mild CKD (GFR = 60-89 mL/min/1.73 square meters)    Stage 3A Moderate CKD (GFR = 45-59 mL/min/1.73 square meters)    Stage 3B Moderate CKD (GFR = 30-44 mL/min/1.73 square meters)    Stage 4 Severe CKD (GFR = 15-29 mL/min/1.73 square meters)    Stage 5 End Stage CKD (GFR <15 mL/min/1.73 square meters)  Note: GFR calculation is accurate only with a steady state creatinine    C-reactive protein [[id number]]  (Normal) Collected: 01/26/25 1551    Lab Status: Final result Specimen: Blood from Arm, Right Updated: 01/26/25 1624     CRP 2.3 mg/L     CBC and differential [[id number]]  (Abnormal) Collected: 01/26/25 1551    Lab Status: Final result Specimen: Blood from Arm, Right Updated: 01/26/25 1600     WBC 7.37 Thousand/uL      RBC 5.76 Million/uL      Hemoglobin 15.2 g/dL      Hematocrit 46.4 %      MCV 81 fL      MCH 26.4 pg      MCHC 32.8 g/dL      RDW 13.9 %      MPV 10.1 fL      Platelets 284 Thousands/uL      nRBC 0 /100 WBCs      Segmented % 52 %      Immature Grans % 0 %      Lymphocytes % 38 %      Monocytes % 7 %      Eosinophils Relative 2 %      Basophils Relative 1 %      Absolute Neutrophils 3.84 Thousands/µL      Absolute Immature Grans 0.02 Thousand/uL      Absolute Lymphocytes 2.77 Thousands/µL       Absolute Monocytes 0.54 Thousand/µL      Eosinophils Absolute 0.14 Thousand/µL      Basophils Absolute 0.06 Thousands/µL     FLU/COVID Rapid Antigen (30 min. TAT) - Preferred screening test in ED [[id number]]  (Normal) Collected: 01/26/25 1134    Lab Status: Final result Specimen: Nares from Nose Updated: 01/26/25 1157     SARS COV Rapid Antigen Negative     Influenza A Rapid Antigen Negative     Influenza B Rapid Antigen Negative    Narrative:      This test has been performed using the [employer] Bertha 2 FLU+SARS Antigen test under the Emergency Use Authorization (EUA). This test has been validated by the  and verified by the performing laboratory. The Bertha uses lateral flow immunofluorescent sandwich assay to detect SARS-COV, Influenza A and Influenza B Antigen.     The [employer] Bertha 2 SARS Antigen test does not differentiate between SARS-CoV and SARS-CoV-2.     Negative results are presumptive and may be confirmed with a molecular assay, if necessary, for patient management. Negative results do not rule out SARS-CoV-2 or influenza infection and should not be used as the sole basis for treatment or patient management decisions. A negative test result may occur if the level of antigen in a sample is below the limit of detection of this test.     Positive results are indicative of the presence of viral antigens, but do not rule out bacterial infection or co-infection with other viruses.     All test results should be used as an adjunct to clinical observations and other information available to the provider.    FOR PEDIATRIC PATIENTS - copy/paste COVID Guidelines URL to browser: [email or web address]            CT head without contrast   Final Interpretation by Makenna Braden MD (01/26 1816)      No acute intracranial abnormality.      Sinus disease as above.            Workstation performed: [id number]             Procedures    ED  Medication and Procedure Management   Prior to Admission Medications   Prescriptions Last Dose Informant Patient Reported? Taking?   dextromethorphan-guaifenesin (MUCINEX DM)  MG per 12 hr tablet   Yes No   Sig: Take 2 tablets by mouth every morning   methocarbamol (ROBAXIN) 500 mg tablet   No No   Sig: Take 1 tablet (500 mg total) by mouth 4 (four) times a day for 5 days      Facility-Administered Medications: None     Discharge Medication List as of 1/26/2025  6:23 PM        CONTINUE these medications which have NOT CHANGED    Details   dextromethorphan-guaifenesin (MUCINEX DM)  MG per 12 hr tablet Take 2 tablets by mouth every morning, Starting Wed 1/12/2022, Historical Med      methocarbamol (ROBAXIN) 500 mg tablet Take 1 tablet (500 mg total) by mouth 4 (four) times a day for 5 days, Starting Mon 7/29/2024, Until Sat 8/3/2024, Normal             ED SEPSIS DOCUMENTATION   Time reflects when diagnosis was documented in both MDM as applicable and the Disposition within this note       Time User Action Codes Description Comment    1/26/2025  5:11 PM Pineda Jaramillo [R51.9] Headache     1/26/2025  5:12 PM Pineda Jaramillo [Z87.74] History of bicuspid aortic valve                  Pineda Jaramillo MD  01/27/25 5306